# Patient Record
Sex: MALE | Race: OTHER
[De-identification: names, ages, dates, MRNs, and addresses within clinical notes are randomized per-mention and may not be internally consistent; named-entity substitution may affect disease eponyms.]

---

## 2017-01-02 ENCOUNTER — HOSPITAL ENCOUNTER (INPATIENT)
Dept: HOSPITAL 74 - YASAS | Age: 52
LOS: 4 days | Discharge: HOME | DRG: 773 | End: 2017-01-06
Attending: INTERNAL MEDICINE | Admitting: INTERNAL MEDICINE
Payer: COMMERCIAL

## 2017-01-02 VITALS — BODY MASS INDEX: 23.1 KG/M2

## 2017-01-02 DIAGNOSIS — E78.00: ICD-10-CM

## 2017-01-02 DIAGNOSIS — F10.230: ICD-10-CM

## 2017-01-02 DIAGNOSIS — F19.24: ICD-10-CM

## 2017-01-02 DIAGNOSIS — F11.23: Primary | ICD-10-CM

## 2017-01-02 DIAGNOSIS — F17.210: ICD-10-CM

## 2017-01-02 DIAGNOSIS — F39: ICD-10-CM

## 2017-01-02 DIAGNOSIS — F41.9: ICD-10-CM

## 2017-01-02 DIAGNOSIS — I10: ICD-10-CM

## 2017-01-02 DIAGNOSIS — Z59.0: ICD-10-CM

## 2017-01-02 DIAGNOSIS — F12.20: ICD-10-CM

## 2017-01-02 DIAGNOSIS — F32.9: ICD-10-CM

## 2017-01-02 LAB
APPEARANCE UR: CLEAR
BILIRUB UR STRIP.AUTO-MCNC: NEGATIVE MG/DL
COLOR UR: YELLOW
KETONES UR QL STRIP: NEGATIVE
LEUKOCYTE ESTERASE UR QL STRIP.AUTO: NEGATIVE
NITRITE UR QL STRIP: NEGATIVE
PH UR: 5 [PH] (ref 5–8)
PROT UR QL STRIP: NEGATIVE
PROT UR QL STRIP: NEGATIVE
RBC # UR STRIP: NEGATIVE /UL
SP GR UR: 1.03 (ref 1–1.03)
UROBILINOGEN UR STRIP-MCNC: NEGATIVE E.U./DL (ref 0.2–1)

## 2017-01-02 PROCEDURE — HZ2ZZZZ DETOXIFICATION SERVICES FOR SUBSTANCE ABUSE TREATMENT: ICD-10-PCS | Performed by: INTERNAL MEDICINE

## 2017-01-02 RX ADMIN — Medication SCH MG: at 22:11

## 2017-01-02 SDOH — ECONOMIC STABILITY - HOUSING INSECURITY: HOMELESSNESS: Z59.0

## 2017-01-02 NOTE — HP
COWS





- Scale


Resting Pulse: 0= VT 80 or Below


Sweatin=Flushed/Facial Moisture


Restless Observation: 3= Extraneous Movement


Pupil Size: 2= Moderately Dilated


Bone or Joint Aches: 2= Severe Diffuse Aches


Runny Nose/ Eye Tearin= Runny Nose/Eyes


GI Upset > 30mins: 3= Vomiting/Diarrhea


Tremor Observation: 2= Slight Tremor Visible


Yawning Observation: 2= >3x During Session


Anxiety or Irritability: 2=Irritable/Anxious


Goose Flesh Skin: 0=Smooth Skin


COWS Score: 20





Admission ROS [facility]





- HPI


Chief Complaint: 


I NEED HELP TO STOP USING HEROIN


Allergies/Adverse Reactions: 


 Allergies











Allergy/AdvReac Type Severity Reaction Status Date / Time


 


Fish Containing Products Allergy Mild Rash Verified 17 13:47


 


No Known Drug Allergies Allergy   Verified 17 13:47


 


cheese AdvReac Intermediate Swelling Verified 17 13:47











History of Present Illness: 


THIS 51 YEARS OLD MALE WITH HEROIN DEPENDENCE,WITHDRAWAL SYMPTOM,LAST [facility] 10/12/

16 TO 16


SEVERAL ADMISSIONS IN THE LAST


HTN


HPERCHOLESTEROLEMIA


NICOTINE DEPENDENCE


LONGEST PERIOD 10 YEARS 





Exam Limitations: No Limitations





- Ebola screening


Have you traveled outside of the country in the last 21 days: No (N)


Have you had contact with anyone from an Ebola affected area: No


Have you been sick,other than usual withdrawal symptoms: No


Do you have a fever: No





- Review of Systems


Constitutional: Chills, Diaphoresis, Loss of Appetite, Malaise, Night Sweats, 

Changes in sleep, Weakness, Unintentional Wgt. Loss


EENT: reports: Hearing Loss, Nose Congestion


Respiratory: reports: No Symptoms reported


Cardiac: reports: No Symptoms Reported


GI: reports: Diarrhea, Nausea, Vomiting, Abdominal cramping


: reports: No Symptoms Reported


Musculoskeletal: reports: Back Pain, Joint Pain, Muscle Pain, Joint Stiffness


Integumentary: reports: Dryness


Neuro: reports: Headache, Tremors


Endocrine: reports: No Symptoms Reported


Hematology: reports: No Symptoms Reported


Psychiatric: reports: Depressed





Patient History





- Patient Medical History


Hx Anemia: No


Hx Asthma: No


Hx Chronic Obstructive Pulmonary Disease (COPD): No


Hx Cardiac Disorders: No


Hx Hypertension: Yes (NO MEDICATION)


Hx Hypercholesterolemia: Yes (LIPITOR 20MG LAST TAKEN 6 MONTHS AGO)


Hx Pacemaker: No


HX Cerebrovascular Accident: No


Hx Seizures: No


Hx Dementia: No


Hx Diabetes: No


Hx Gastrointestinal Disorders: No


Hx Liver Disease: No


Hx Genitourinary Disorders: No


Hx Sexually Transmitted Disorders: No


Hx Renal Disease (ESRD): No


Hx Thyroid Disease: No


Hx Human Immunodeficiency Virus (HIV): No (LAST  NEGATIVE)


Hx Hepatitis C: No


Hx Depression: Yes (NO MED)


Hx Suicide Attempt: No


Hx Bipolar Disorder: No


Hx Schizophrenia: No


Other Medical History: NO SUIDAL,NO HOMICIDAL





- Patient Surgical History


Past Surgical History: No


Hx Neurologic Surgery: No


Hx Cataract Extraction: No


Hx Cardiac Surgery: No


Hx Lung Surgery: No


Hx Breast Surgery: No


Hx Breast Biopsy: No


Hx Abdominal Surgery: No


Hx Appendectomy: No


Hx Cholecystectomy: No


Hx Genitourinary Surgery: No


Hx  Section: No


Hx Orthopedic Surgery: No


Anesthesia Reaction: No





- PPD History


Previous Implant?: Yes


Documented Results: Negative w/o proof


Date: 10/06/15


Results: 0mm


PPD to be Administered?: No





- Smoking Cessation


Smoking history: Current every day smoker


Have you smoked in the past 12 months: Yes


Aproximately how many cigarettes per day: 15


Cigars Per Day: 0


Hx Chewing Tobacco Use: No


Initiated information on smoking cessation: Yes


'Breaking Loose' booklet given: 17





- Substance & Tx. History


Hx Alcohol Use: No


Hx Substance Use: Yes


Substance Use Type: Heroin


Hx Substance Use Treatment: Yes (LAST [facility] 10/12/15 TO 10/17/16)





- Substances Abused


  ** Heroin


Route: Inhalation


Frequency: Daily


Amount used: 6-8 bags


Age of first use: 35


Date of Last Use: 16





Family Disease History





- Family Disease History


Family Disease History: Heart Disease: Father (MI,), Mother (ETOH 

DEPENDENT AND ,MI), Brother (MI), Other: Mother





Admission Physical Exam [facility]





- Vital Signs


Vital Signs: 


 Vital Signs - 24 hr











  17





  12:18


 


Temperature 97.8 F


 


Pulse Rate 64


 


Respiratory 18





Rate 


 


Blood Pressure 133/87














- Physical


General Appearance: Yes: Moderate Distress, Tremorous, Irritable, Sweating, 

Anxious


HEENTM: Yes: Nasal Congestion


Respiratory: Yes: Lungs Clear


Neck: Yes: Within Normal Limits


Breast: Yes: Within Normal Limits


Cardiology: Yes: Within Normal Limits, Regular Rhythm, Regular Rate, S1, S2


Abdominal: Yes: Within Normal Limits, Normal Bowel Sounds, Non Tender, Soft


Genitourinary: Yes: Within Normal Limits


Back: Yes: Muscle Spasm


Musculoskeletal: Yes: Back pain, Joint Stiffness, Muscle Pain


Extremities: Yes: Tremors


Neurological: Yes: CNs II-XII NML intact, Fully Oriented, Alert, Motor Strength 

5/5


Integumentary: Yes: Dry


Lymphatic: Yes: Within Normal Limits





- Diagnostic


(1) Nicotine dependence


Current Visit: No   Status: Chronic   





(2) Opioid dependence with withdrawal


Current Visit: No   Status: Chronic





(3) Depression


Current Visit: Yes   Status: Acute   





(4) Hypertension


Current Visit: Yes   Status: Acute   





(5) Hypercholesterolemia


Current Visit: Yes   Status: Acute








Cleared for Admission [facility]





- Detox or Rehab


[facility] Level of Care: Medically Managed


Detox Regimen/Protocol: Methadone





[facility] Breath Alcohol Content


Breath Alcohol Content: 0





Urine Drug Screen





- Results


Urine Drug Screen Results: THC-Marijuana, YIMI-Cocaine, OPI-Opiates

## 2017-01-03 LAB
ALBUMIN SERPL-MCNC: 3.8 G/DL (ref 3.4–5)
ALP SERPL-CCNC: 79 U/L (ref 45–117)
ALT SERPL-CCNC: 16 U/L (ref 12–78)
ANION GAP SERPL CALC-SCNC: 7 MMOL/L (ref 8–16)
AST SERPL-CCNC: 7 U/L (ref 15–37)
BILIRUB SERPL-MCNC: 0.6 MG/DL (ref 0.2–1)
CALCIUM SERPL-MCNC: 9.1 MG/DL (ref 8.5–10.1)
CHOLEST SERPL-MCNC: 214 MG/DL (ref 50–200)
CO2 SERPL-SCNC: 29 MMOL/L (ref 21–32)
CREAT SERPL-MCNC: 0.8 MG/DL (ref 0.7–1.3)
DEPRECATED RDW RBC AUTO: 13.9 % (ref 11.9–15.9)
GLUCOSE SERPL-MCNC: 86 MG/DL (ref 74–106)
HIV 1 & 2 AB: NEGATIVE
HIV 1 AGP24: NEGATIVE
LDLC SERPL CALC-MCNC: 135 MG/DL (ref 5–100)
MCH RBC QN AUTO: 29.3 PG (ref 25.7–33.7)
MCHC RBC AUTO-ENTMCNC: 32 G/DL (ref 32–35.9)
MCV RBC: 91.4 FL (ref 80–96)
PLATELET # BLD AUTO: 235 K/MM3 (ref 134–434)
PMV BLD: 10.2 FL (ref 7.5–11.1)
PROT SERPL-MCNC: 7.2 G/DL (ref 6.4–8.2)
WBC # BLD AUTO: 6.4 K/MM3 (ref 4–10)

## 2017-01-03 RX ADMIN — Medication SCH MG: at 22:09

## 2017-01-03 RX ADMIN — Medication SCH TAB: at 10:20

## 2017-01-03 NOTE — PN
S CIWA





- CIWA Score


Nausea/Vomitin-Int. Nausea w/Dry Heave


Muscle Tremors: 3


Anxiety: 3


Agitation: 2


Paroxysmal Sweats: 3


Orientation: 0-Oriented


Tacttile Disturbances: 2-Mild Itch/Numbness/Burn


Auditory Disturbances: 0-None


Visual Disturbances: 0-None


Headache: 0-None Present


CIWA-Ar Total Score: 17





[facility]HS Progress Note (SOAP)


Subjective: 


SWEATS, NAUSEA, VOMITING, LBP


Objective: 


17 10:27


 Vital Signs











Temperature  97.7 F   17 10:25


 


Pulse Rate  72   17 10:25


 


Respiratory Rate  16   17 10:25


 


Blood Pressure  132/75   17 10:25


 


O2 Sat by Pulse Oximetry (%)      








 Vital Signs











Temperature  97.7 F   17 10:25


 


Pulse Rate  72   17 10:25


 


Respiratory Rate  16   17 10:25


 


Blood Pressure  132/75   17 10:25


 


O2 Sat by Pulse Oximetry (%)      








 Laboratory Tests











  17





  22:40


 


Urine Color  Yellow


 


Urine Appearance  Clear


 


Urine pH  5.0


 


Ur Specific Gravity  1.031


 


Urine Protein  Negative


 


Urine Glucose (UA)  Negative


 


Urine Ketones  Negative


 


Urine Blood  Negative


 


Urine Nitrite  Negative


 


Urine Bilirubin  Negative


 


Urine Urobilinogen  Negative


 


Ur Leukocyte Esterase  Negative








PENDING LABS 





17 10:28


PT AOX3 LYING IN BED 


Assessment: 





17 10:28


WITHDRAWL SX;S 


Plan: 


CONT. DETOX 


INCREASE FLUIDS 


ZOFRAN PRN 


F/UP PENDING  LABS 


MOTRIN PRN

## 2017-01-03 NOTE — CONSULT
[facility] Psychiatric Consult





- Data


Date of interview: 01/03/17


Admission source: [facility]


Identifying data: Readmission to [facility] for this 52 y/o  male 

seeking detox treatment [address] for heroin dependence.Patient is single,a 

father of six,homeless,unemployed and reportedly deprived of any source of 

income.


Substance Abuse History: - Smoking Cessation.  Smoking history: Current every 

day smoker.  Have you smoked in the past 12 months: Yes.  Aproximately how many 

cigarettes per day: 15.  Cigars Per Day: 0.  Hx Chewing Tobacco Use: No.  

Initiated information on smoking cessation: Yes.  'Breaking Loose' booklet given

: 01/02/17.  - Substance & Tx. History.  Hx Alcohol Use: No.  Hx Substance Use: 

Yes.  Substance Use Type: Heroin.  Hx Substance Use Treatment: Yes (LAST [facility] 10

/12/15 TO 10/17/16).  - Substances Abused.  ** Heroin.  Route: Inhalation.  

Frequency: Daily.  Amount used: 6-8 bags.  Age of first use: 35.  Date of Last 

Use: 12/31/16.  Confirmed by patient.


Medical History: Consistent with a history of hypertension and dyslipidemia.


Psychiatric History: History of 3-4 psychiatric hospitalizations (onset of 

psychiatric disturbances at age of 18).First psychiatric decompensation was 

precipitated by PCP intoxication.Never hospitalized in [facility].All 

admissions were in [place].Mr Garcia endorses the diagnosis of MDD.He 

admits to a remote history of suicide attempt via self-mutilation.No OPD 

care.Patient used to be on seroquel ([phone] during stay on [address] [place] [address]).Lost to 

follow up for several months.He declines to be on psychotropic medications 

other than detox agents.


Physical/Sexual Abuse/Trauma History: Patient denies.





Mental Status Exam





- Mental Status Exam


Alert and Oriented to: Time, Place, Person


Cognitive Function: Good


Patient Appearance: Well Groomed


Mood: Withdrawn, Anxious, Hopeful


Affect: Mood Congruent


Patient Behavior: Fatigued, Appropriate, Cooperative


Speech Pattern: Clear


Voice Loudness: Normal


Thought Process: Goal Oriented


Thought Disorder: Not Present


Hallucinations: Denies


Suicidal Ideation: Denies


Homicidal Ideation: Denies


Insight/Judgement: Poor


Sleep: Fair


Appetite: Good


Muscle strength/Tone: Normal


Gait/Station: Normal





Psychiatric Findings





- Problem List (Axis 1, 2,3)


(1) Heroin dependence


Current Visit: Yes   Status: Acute





(2) Nicotine dependence


Current Visit: Yes   Status: Acute   





(3) Opioid dependence with withdrawal


Current Visit: Yes   Status: Acute





(4) Drug-induced mood disorder


Current Visit: Yes   Status: Suspected





(5) Hypercholesterolemia


Current Visit: Yes   Status: Chronic





(6) Hypertension


Current Visit: Yes   Status: Chronic   








- Initial Treatment Plan


Initial Treatment Plan: Psychoeducation.Detoxification.Observation.

## 2017-01-04 RX ADMIN — Medication SCH TAB: at 10:35

## 2017-01-04 RX ADMIN — Medication SCH MG: at 22:23

## 2017-01-04 NOTE — PN
[facility] CIWA





- CIWA Score


Nausea/Vomitin-No Nausea/No Vomiting


Muscle Tremors: 4-Moderate,w/Arms Extend


Anxiety: 4-Mod. Anxious/Guarded


Agitation: 4-Moderately Restless


Paroxysmal Sweats: 3


Orientation: 0-Oriented


Tacttile Disturbances: 0-None


Auditory Disturbances: 0-None


Visual Disturbances: 0-None


Headache: 1-Very Mild


CIWA-Ar Total Score: 16





B[facility] Progress Note (SOAP)


Subjective: 


agitation


sweats


interrupted sleep


shakes mild


Objective: 


17 10:29


 Vital Signs











Temperature  96.7 F L  17 06:26


 


Pulse Rate  77   17 06:26


 


Respiratory Rate  18   17 06:26


 


Blood Pressure  110/70   17 06:26


 


O2 Sat by Pulse Oximetry (%)      








 Laboratory Tests











  17





  06:30 22:40 06:30


 


WBC    6.4


 


RBC    4.89


 


Hgb    14.3  D


 


Hct    44.7


 


MCV    91.4


 


MCHC    32.0


 


RDW    13.9


 


Plt Count    235


 


MPV    10.2


 


Sodium   


 


Potassium   


 


Chloride   


 


Carbon Dioxide   


 


Anion Gap   


 


BUN   


 


Creatinine   


 


Creat Clearance w eGFR   


 


Random Glucose   


 


Calcium   


 


Total Bilirubin   


 


AST   


 


ALT   


 


Alkaline Phosphatase   


 


Total Protein   


 


Albumin   


 


Triglycerides   


 


Cholesterol   


 


Total LDL Cholesterol   


 


HDL Cholesterol   


 


Urine Color   Yellow 


 


Urine Appearance   Clear 


 


Urine pH   5.0 


 


Ur Specific Gravity   1.031 


 


Urine Protein   Negative 


 


Urine Glucose (UA)   Negative 


 


Urine Ketones   Negative 


 


Urine Blood   Negative 


 


Urine Nitrite   Negative 


 


Urine Bilirubin   Negative 


 


Urine Urobilinogen   Negative 


 


Ur Leukocyte Esterase   Negative 


 


RPR Titer   


 


HIV 1&2 Antibody Screen  Negative  


 


HIV P24 Antigen  Negative  














  17





  06:30 06:30


 


WBC  


 


RBC  


 


Hgb  


 


Hct  


 


MCV  


 


MCHC  


 


RDW  


 


Plt Count  


 


MPV  


 


Sodium  143 


 


Potassium  4.5 


 


Chloride  107 


 


Carbon Dioxide  29 


 


Anion Gap  7 L 


 


BUN  9 


 


Creatinine  0.8 


 


Creat Clearance w eGFR  > 60 


 


Random Glucose  86 


 


Calcium  9.1 


 


Total Bilirubin  0.6 


 


AST  7 L 


 


ALT  16 


 


Alkaline Phosphatase  79  D 


 


Total Protein  7.2 


 


Albumin  3.8  D 


 


Triglycerides  108 


 


Cholesterol  214 H 


 


Total LDL Cholesterol  135 H 


 


HDL Cholesterol  71 H 


 


Urine Color  


 


Urine Appearance  


 


Urine pH  


 


Ur Specific Gravity  


 


Urine Protein  


 


Urine Glucose (UA)  


 


Urine Ketones  


 


Urine Blood  


 


Urine Nitrite  


 


Urine Bilirubin  


 


Urine Urobilinogen  


 


Ur Leukocyte Esterase  


 


RPR Titer   Nonreactive


 


HIV 1&2 Antibody Screen  


 


HIV P24 Antigen  














awake/alert


ambulating


no acute distress


Assessment: 


17 10:31


withdrawal sx











Plan: 


continue detox


increase fluids


pt advised to see pmd for evaluation on his increased lipids after discharge 

from detox. 


pt is not currently taking any hypercholesterol medication.

## 2017-01-05 RX ADMIN — Medication SCH TAB: at 10:09

## 2017-01-05 RX ADMIN — Medication SCH MG: at 22:18

## 2017-01-05 NOTE — PN
[facility] Progress Note (SOAP)


Subjective: 


sweats , shakes , n/diarrhea, stomach discomfort 


Objective: 





01/05/17 10:33


 Vital Signs











Temperature  98.4 F   01/05/17 09:55


 


Pulse Rate  71   01/05/17 09:55


 


Respiratory Rate  18   01/05/17 09:55


 


Blood Pressure  149/75   01/05/17 09:55


 


O2 Sat by Pulse Oximetry (%)      








 Laboratory Tests











  01/02/17 01/02/17 01/03/17





  06:30 22:40 06:30


 


WBC    6.4


 


RBC    4.89


 


Hgb    14.3  D


 


Hct    44.7


 


MCV    91.4


 


MCHC    32.0


 


RDW    13.9


 


Plt Count    235


 


MPV    10.2


 


Sodium   


 


Potassium   


 


Chloride   


 


Carbon Dioxide   


 


Anion Gap   


 


BUN   


 


Creatinine   


 


Creat Clearance w eGFR   


 


Random Glucose   


 


Calcium   


 


Total Bilirubin   


 


AST   


 


ALT   


 


Alkaline Phosphatase   


 


Total Protein   


 


Albumin   


 


Triglycerides   


 


Cholesterol   


 


Total LDL Cholesterol   


 


HDL Cholesterol   


 


Urine Color   Yellow 


 


Urine Appearance   Clear 


 


Urine pH   5.0 


 


Ur Specific Gravity   1.031 


 


Urine Protein   Negative 


 


Urine Glucose (UA)   Negative 


 


Urine Ketones   Negative 


 


Urine Blood   Negative 


 


Urine Nitrite   Negative 


 


Urine Bilirubin   Negative 


 


Urine Urobilinogen   Negative 


 


Ur Leukocyte Esterase   Negative 


 


RPR Titer   


 


HIV 1&2 Antibody Screen  Negative  


 


HIV P24 Antigen  Negative  














  01/03/17 01/03/17





  06:30 06:30


 


WBC  


 


RBC  


 


Hgb  


 


Hct  


 


MCV  


 


MCHC  


 


RDW  


 


Plt Count  


 


MPV  


 


Sodium  143 


 


Potassium  4.5 


 


Chloride  107 


 


Carbon Dioxide  29 


 


Anion Gap  7 L 


 


BUN  9 


 


Creatinine  0.8 


 


Creat Clearance w eGFR  > 60 


 


Random Glucose  86 


 


Calcium  9.1 


 


Total Bilirubin  0.6 


 


AST  7 L 


 


ALT  16 


 


Alkaline Phosphatase  79  D 


 


Total Protein  7.2 


 


Albumin  3.8  D 


 


Triglycerides  108 


 


Cholesterol  214 H 


 


Total LDL Cholesterol  135 H 


 


HDL Cholesterol  71 H 


 


Urine Color  


 


Urine Appearance  


 


Urine pH  


 


Ur Specific Gravity  


 


Urine Protein  


 


Urine Glucose (UA)  


 


Urine Ketones  


 


Urine Blood  


 


Urine Nitrite  


 


Urine Bilirubin  


 


Urine Urobilinogen  


 


Ur Leukocyte Esterase  


 


RPR Titer   Nonreactive


 


HIV 1&2 Antibody Screen  


 


HIV P24 Antigen  








pt aox3 in nad ambulating 


Assessment: 


01/05/17 10:48


withdrawl sx's 





01/05/17 10:49





Plan: 


cont. detox 


increase fluids 


myanta prn

## 2017-01-06 VITALS — DIASTOLIC BLOOD PRESSURE: 99 MMHG | SYSTOLIC BLOOD PRESSURE: 160 MMHG | HEART RATE: 63 BPM | TEMPERATURE: 98.1 F

## 2017-01-06 RX ADMIN — Medication SCH TAB: at 09:58

## 2017-01-06 NOTE — DS
[facility] Detox Discharge Summary


Admission Date: 


01/02/17





Discharge Date: 01/06/17





- History


Present History: Alcohol Dependence, Cannabis Dependence, Opioid Dependence





- Physical Exam Results


Vital Signs: 


 Vital Signs











Temperature  97.9 F   01/06/17 06:00


 


Pulse Rate  55 L  01/06/17 06:00


 


Respiratory Rate  18   01/06/17 06:00


 


Blood Pressure  134/79   01/06/17 06:00


 


O2 Sat by Pulse Oximetry (%)      














- Treatment


Hospital Course: Detox Protocol Followed, Detoxed Safely, Responded well, 

Discharged Condition Good, Rehab Referral Accepted





- Medication


Discharge Medications: 


Ambulatory Orders





NK [No Known Home Medication]  10/12/16 











- Diagnosis


(1) Depression


Current Visit: Yes   Status: Chronic   





(2) Alcohol dependence with uncomplicated withdrawal


Current Visit: Yes   Status: Chronic





(3) Cannabis dependence


Current Visit: Yes   Status: Chronic





(4) Hypercholesterolemia


Current Visit: Yes   Status: Chronic





(5) Hypertension


Current Visit: Yes   Status: Chronic   Qualifiers: 


     Hypertension type: essential hypertension        Qualified Code(s): I10 - 

Essential (primary) hypertension  





(6) Nicotine dependence, uncomplicated


Current Visit: Yes   Status: Chronic   Qualifiers: 


     Nicotine product type: cigarettes        Qualified Code(s): F17.210 - 

Nicotine dependence, cigarettes, uncomplicated  





(7) Opioid dependence with withdrawal


Current Visit: Yes   Status: Chronic





(8) Drug-induced mood disorder


Current Visit: Yes   Status: Suspected





(9) mood disorder nos


Current Visit: No   Status: Active





(10) Anxiety disorder


Current Visit: No   Status: Chronic








- AMA


Did Patient Leave Against Medical Advice: No (pt was p/u for rehab at 

[facility]. )

## 2017-11-14 ENCOUNTER — HOSPITAL ENCOUNTER (INPATIENT)
Dept: HOSPITAL 74 - YASAS | Age: 52
LOS: 6 days | Discharge: OTHER INSTITUTION | DRG: 773 | End: 2017-11-20
Attending: INTERNAL MEDICINE | Admitting: INTERNAL MEDICINE
Payer: COMMERCIAL

## 2017-11-14 VITALS — BODY MASS INDEX: 23.2 KG/M2

## 2017-11-14 DIAGNOSIS — F11.23: Primary | ICD-10-CM

## 2017-11-14 DIAGNOSIS — G47.00: ICD-10-CM

## 2017-11-14 DIAGNOSIS — F19.24: ICD-10-CM

## 2017-11-14 DIAGNOSIS — E78.00: ICD-10-CM

## 2017-11-14 DIAGNOSIS — F10.230: ICD-10-CM

## 2017-11-14 DIAGNOSIS — R63.4: ICD-10-CM

## 2017-11-14 DIAGNOSIS — Z59.0: ICD-10-CM

## 2017-11-14 DIAGNOSIS — I10: ICD-10-CM

## 2017-11-14 DIAGNOSIS — F17.210: ICD-10-CM

## 2017-11-14 LAB
APPEARANCE UR: (no result)
BILIRUB UR STRIP.AUTO-MCNC: NEGATIVE MG/DL
COLOR UR: YELLOW
KETONES UR QL STRIP: NEGATIVE
NITRITE UR QL STRIP: NEGATIVE
PH UR: 5 [PH] (ref 5–8)
PROT UR QL STRIP: NEGATIVE
PROT UR QL STRIP: NEGATIVE
RBC # UR STRIP: NEGATIVE /UL
SP GR UR: 1.03 (ref 1–1.03)
UROBILINOGEN UR STRIP-MCNC: NEGATIVE MG/DL (ref 0.2–1)

## 2017-11-14 PROCEDURE — HZ2ZZZZ DETOXIFICATION SERVICES FOR SUBSTANCE ABUSE TREATMENT: ICD-10-PCS | Performed by: INTERNAL MEDICINE

## 2017-11-14 RX ADMIN — Medication SCH MG: at 22:09

## 2017-11-14 SDOH — ECONOMIC STABILITY - HOUSING INSECURITY: HOMELESSNESS: Z59.0

## 2017-11-14 NOTE — HP
COWS





- Scale


Resting Pulse: 0= DE 80 or Below


Sweatin= Chills/Flushing


Restless Observation: 1= Difficult to Sit Still


Pupil Size: 1= Pupils >than Normal


Bone or Joint Aches: 1= Mild Discomfort


Runny Nose/ Eye Tearin= Nasal Congestion


GI Upset > 30mins: 3= Vomiting/Diarrhea


Tremor Observation: 2= Slight Tremor Visible


Yawning Observation: 1= 1-2x During Session


Anxiety or Irritability: 2=Irritable/Anxious


Goose Flesh Skin: 3=Piloerection


COWS Score: 16





CIWA Score





- CIWA Score


Nausea/Vomiting: 3


Muscle Tremors: 2


Anxiety: 2


Agitation: 1-Slight > Activity


Paroxysmal Sweats: 2


Orientation: 0-Oriented


Tacttile Disturbances: 0-None


Auditory Disturbances: 1-Very Mild


Visual Disturbances: 1-Very Mild Sensitivity


Headache: 1-Very Mild


CIWA-Ar Total Score: 13





Admission ROS S





- HPI


Chief Complaint: 





WITHDRAWAL SYMPTOMS 


Allergies/Adverse Reactions: 


 Allergies











Allergy/AdvReac Type Severity Reaction Status Date / Time


 


Fish Containing Products Allergy Mild Rash Verified 17 18:57


 


No Known Drug Allergies Allergy   Verified 17 18:57


 


cheese AdvReac Intermediate Swelling Verified 17 18:57











History of Present Illness: 





52 Y.O. MAN WITH AN EXTENSIVE HISTORY OF HEROIN AND ALCOHOL DEPENDENCE IS HERE 

SEEKING DETOX.  HE HAS HAD MULTIPLE ADMISSIONS WITH HIS LAST BEING  FOR DETOX. LONGEST PERIOD CLEAN HAS BEEN 15 YEARS WHILE INCARCERATED. 


Exam Limitations: No Limitations





- Ebola screening


Have you traveled outside of the country in the last 21 days: No


Have you had contact with anyone from an Ebola affected area: No


Have you been sick,other than usual withdrawal symptoms: No


Do you have a fever: No





- Review of Systems


Constitutional: Chills, Loss of Appetite, Unintentional Wgt. Loss


EENT: reports: Blurred Vision, Tearing, Nose Congestion


Respiratory: reports: No Symptoms reported


Cardiac: reports: No Symptoms Reported


GI: reports: Diarrhea, Poor Appetite, Vomiting, Abdominal cramping


: reports: No Symptoms Reported


Musculoskeletal: reports: Back Pain


Integumentary: reports: Dryness


Neuro: reports: Tremors


Endocrine: reports: No Symptoms Reported


Hematology: reports: No Symptoms Reported


Psychiatric: reports: Judgement Intact, Mood/Affect Appropiate, Orientated x3, 

Anxious, Depressed


Other Systems: Reviewed and Negative





Patient History





- Patient Medical History


Hx Anemia: No


Hx Asthma: No


Hx Chronic Obstructive Pulmonary Disease (COPD): No


Hx Cancer: No


Hx Cardiac Disorders: Yes (MI-13 YEARS AGO )


Hx Congestive Heart Failure: No


Hx Hypertension: Yes (NO MEDICATION)


Hx Hypercholesterolemia: Yes (LIPITOR 20MG LAST TAKEN 6 MONTHS AGO)


Hx Pacemaker: No


HX Cerebrovascular Accident: No


Hx Seizures: No


Hx Dementia: No


Hx Diabetes: No


Hx Gastrointestinal Disorders: No


Hx Liver Disease: No


Hx Genitourinary Disorders: No


Hx Sexually Transmitted Disorders: No


Hx Renal Disease (ESRD): No


Hx Thyroid Disease: No


Hx Human Immunodeficiency Virus (HIV): No (LAST  NEGATIVE)


Hx Hepatitis C: No


Hx Depression: Yes (NO MED)


Hx Suicide Attempt: No


Hx Bipolar Disorder: No


Hx Schizophrenia: No





- Patient Surgical History


Past Surgical History: No


Hx Neurologic Surgery: No


Hx Cataract Extraction: No


Hx Cardiac Surgery: No


Hx Lung Surgery: No


Hx Breast Surgery: No


Hx Breast Biopsy: No


Hx Abdominal Surgery: No


Hx Appendectomy: No


Hx Cholecystectomy: No


Hx Genitourinary Surgery: No


Hx  Section: No


Hx Orthopedic Surgery: No


Anesthesia Reaction: No





- PPD History


Previous Implant?: Yes


Documented Results: Negative w/proof


Date: 17


Results: 0mm


PPD to be Administered?: No





- Reproductive History


Patient is a Female of Child Bearing Age (11 -55 yrs old): No





- Smoking Cessation


Smoking history: Current every day smoker


Have you smoked in the past 12 months: Yes


Aproximately how many cigarettes per day: 15


Cigars Per Day: 0


Hx Chewing Tobacco Use: No


Initiated information on smoking cessation: Yes


'Breaking Loose' booklet given: 17





- Substance & Tx. History


Hx Alcohol Use: Yes


Hx Substance Use: Yes


Substance Use Type: Alcohol, Heroin


Hx Substance Use Treatment: Yes (DETOX: 2017; NO RECENT REHAB )





- Substances Abused


  ** Alcohol


Route: Oral


Frequency: Daily


Amount used: LIQUOR- 1 , BEER- 1 SIX PACK


Age of first use: 15


Date of Last Use: 17





  ** Heroin


Route: Inhalation


Frequency: Daily


Amount used: 4 BAGS


Age of first use: 11


Date of Last Use: 17





Family Disease History





- Family Disease History


Family Disease History: Heart Disease: Father (MI,), Mother (ETOH 

DEPENDENT AND ,MI), Brother (MI), Other: Mother





Admission Physical Exam [facility]





- Vital Signs


Vital Signs: 


 Vital Signs - 24 hr











  17





  17:39


 


Temperature 97.2 F L


 


Pulse Rate 71


 


Respiratory 16





Rate 


 


Blood Pressure 137/81














- Physical


General Appearance: Yes: Disheveled, Sweating, Anxious


HEENTM: Yes: Hearing grossly Normal, Normocephalic, Normal Voice


Respiratory: Yes: Chest Non-Tender, Lungs Clear, Normal Breath Sounds


Neck: Yes: No masses,lesions,Nodules, Trachea in good position


Breast: Yes: Breast Exam Deferred


Cardiology: Yes: Regular Rhythm, Regular Rate


Abdominal: Yes: Normal Bowel Sounds, Non Tender, Flat


Genitourinary: Yes: Other (NO COMPLAINTS REPORTED)


Back: Yes: Normal Inspection


Musculoskeletal: Yes: Gait Steady, Back pain


Extremities: Yes: Normal Capillary Refill, Normal Inspection, Normal Range of 

Motion


Neurological: Yes: Fully Oriented, Alert, Motor Strength 5/5, Normal Mood/Affect

, Normal Response


Integumentary: Yes: Dry


Lymphatic: Yes: Within Normal Limits





- Diagnostic


(1) Alcohol dependence with uncomplicated withdrawal


Current Visit: Yes   Status: Chronic   





(2) Hypercholesterolemia


Current Visit: Yes   Status: Chronic   





(3) Hypertension


Current Visit: Yes   Status: Chronic   


Qualifiers: 


   Hypertension type: essential hypertension   Qualified Code(s): I10 - 

Essential (primary) hypertension   





(4) Nicotine dependence, uncomplicated


Current Visit: Yes   Status: Chronic   


Qualifiers: 


   Nicotine product type: cigarettes   Qualified Code(s): F17.210 - Nicotine 

dependence, cigarettes, uncomplicated   





(5) Opioid dependence with withdrawal


Current Visit: Yes   Status: Chronic   





(6) Weight loss


Current Visit: Yes   Status: Acute   





Cleared for Admission [facility]





- Detox or Rehab


[facility] Level of Care: Medically Managed


Detox Regimen/Protocol: Methadone/Librium





[facility] Breath Alcohol Content


Breath Alcohol Content: 0





Urine Drug Screen





- Results


Drug Screen Negative: No


Urine Drug Screen Results: OPI-Opiates, MTD-Methadone, TCA-Tricyclic Antidepress

## 2017-11-15 LAB
ALBUMIN SERPL-MCNC: 3.1 G/DL (ref 3.4–5)
ALP SERPL-CCNC: 66 U/L (ref 45–117)
ALT SERPL-CCNC: 18 U/L (ref 12–78)
ANION GAP SERPL CALC-SCNC: 9 MMOL/L (ref 8–16)
AST SERPL-CCNC: 7 U/L (ref 15–37)
BILIRUB SERPL-MCNC: 0.4 MG/DL (ref 0.2–1)
CALCIUM SERPL-MCNC: 8.2 MG/DL (ref 8.5–10.1)
CO2 SERPL-SCNC: 27 MMOL/L (ref 21–32)
CREAT SERPL-MCNC: 0.7 MG/DL (ref 0.7–1.3)
DEPRECATED RDW RBC AUTO: 13.9 % (ref 11.9–15.9)
GLUCOSE SERPL-MCNC: 70 MG/DL (ref 74–106)
HIV 1 & 2 AB: NEGATIVE
HIV 1 AGP24: NEGATIVE
MCH RBC QN AUTO: 29.3 PG (ref 25.7–33.7)
MCHC RBC AUTO-ENTMCNC: 32.2 G/DL (ref 32–35.9)
MCV RBC: 91 FL (ref 80–96)
PLATELET # BLD AUTO: 249 K/MM3 (ref 134–434)
PMV BLD: 9.5 FL (ref 7.5–11.1)
PROT SERPL-MCNC: 6.1 G/DL (ref 6.4–8.2)
WBC # BLD AUTO: 6.6 K/MM3 (ref 4–10)

## 2017-11-15 RX ADMIN — Medication SCH TAB: at 10:07

## 2017-11-15 RX ADMIN — Medication SCH MG: at 22:04

## 2017-11-15 RX ADMIN — QUETIAPINE FUMARATE SCH MG: 100 TABLET ORAL at 22:05

## 2017-11-15 NOTE — PN
S CIWA





- CIWA Score


Nausea/Vomitin (N/V)


Muscle Tremors: 2


Anxiety: 3


Agitation: 3


Paroxysmal Sweats: 1-Minimal Palms Moist


Orientation: 0-Oriented


Tacttile Disturbances: 2-Mild Itch/Numbness/Burn


Auditory Disturbances: 0-None


Visual Disturbances: 0-None


Headache: 0-None Present


CIWA-Ar Total Score: 16





BHS Progress Note (SOAP)


Subjective: 





ANXIETY,SWEATS,NAUSEA/VOMITING,INTERMITTENT SLEEP.


Objective: 





11/15/17 08:46


 Vital Signs











Temperature  97.7 F   11/15/17 05:44


 


Pulse Rate  56 L  11/15/17 05:44


 


Respiratory Rate  18   11/15/17 05:44


 


Blood Pressure  119/71   11/15/17 05:44


 


O2 Sat by Pulse Oximetry (%)      








 Laboratory Last Values











Urine Color  Yellow   17  21:30    


 


Urine Appearance  Slcloudy   17  21:30    


 


Urine pH  5.0  (5.0-8.0)   17  21:30    


 


Ur Specific Gravity  1.027  (1.001-1.035)   17  21:30    


 


Urine Protein  Negative  (NEGATIVE)   17  21:30    


 


Urine Glucose (UA)  Negative  (NEGATIVE)   17  21:30    


 


Urine Ketones  Negative  (NEGATIVE)   17  21:30    


 


Urine Blood  Negative  (NEGATIVE)   17  21:30    


 


Urine Nitrite  Negative  (NEGATIVE)   17  21:30    


 


Urine Bilirubin  Negative  (NEGATIVE)   17  21:30    


 


Urine Urobilinogen  Negative mg/dL (0.2-1.0)   17  21:30    








OTHER LABS PENDING


Assessment: 





11/15/17 08:47


WITHDRAWAL SX


Plan: 





CONTINUE DETOX

## 2017-11-15 NOTE — CONSULT
BHS Psychiatric Consult





- Data


Date of interview: 11/15/17


Admission source: [facility]


Identifying data: Another admission to [facility] for this 51 y/o  male 

seeking detox treatment [address] for heroin dependence.Patient is single,a 

father of six,homeless,unemployed and supported on food stamps.


Substance Abuse History: Patient admits to active use of alcohol and heroin as 

detailed in this report from [facility] .  Smoking history: Current every day smoker.  

Have you smoked in the past 12 months: Yes.  Aproximately how many cigarettes 

per day: 15.  Cigars Per Day: 0.  Hx Chewing Tobacco Use: No.  Initiated 

information on smoking cessation: Yes.  'Breaking Loose' booklet given: 11/14/ 17.  - Substance & Tx. History.  Hx Alcohol Use: Yes.  Hx Substance Use: Yes.  

Substance Use Type: Alcohol, Heroin.  Hx Substance Use Treatment: Yes (DETOX: 1/ 2017; NO RECENT REHAB ).  - Substances Abused.  ** Alcohol.  Route: Oral.  

Frequency: Daily.  Amount used: LIQUOR- 1 , BEER- 1 SIX PACK.  Age of first use

: 15.  Date of Last Use: 11/14/17.  ** Heroin.  Route: Inhalation.  Frequency: 

Daily.  Amount used: 4 BAGS.  Age of first use: 11.  Date of Last Use: 11/13/17


Medical History: History of myocardial infarction,hypertension and dyslipidemia.


Psychiatric History: History of multiple psychiatric hospitalizations.Onset of 

psychiatric disturbances : age 18.[place] hospitalizations [place].Mr Garcia reports the diagnosis of MDD.Remote history of suicide 

attempt via self-mutilation.No OPD care.Patient used to be on seroquel (2011-

2013 during stay on [address] [place] [address]).Lost to follow up for several months.Patient 

requests that seroquel 100 mg be included in this hospital course.


Physical/Sexual Abuse/Trauma History: No reported history of abuse.


Additional Comment: Urine Drug Screen Results: OPI-Opiates, MTD-Methadone, TCA-

Tricyclic Antidepressant.Noted.





Mental Status Exam





- Mental Status Exam


Alert and Oriented to: Time, Place, Person


Cognitive Function: Good


Patient Appearance: Well Groomed


Mood: Nervous, Withdrawn, Anxious


Affect: Mood Congruent


Patient Behavior: Fatigued, Appropriate, Cooperative


Speech Pattern: Clear


Voice Loudness: Normal


Thought Process: Goal Oriented


Thought Disorder: Not Present


Hallucinations: Denies


Suicidal Ideation: Denies


Homicidal Ideation: Denies


Insight/Judgement: Poor


Sleep: Poorly, Difficulty falling asleep


Appetite: Good


Muscle strength/Tone: Normal


Gait/Station: Normal





Psychiatric Findings





- Problem List (Axis 1, 2,3)


(1) Alcohol dependence with uncomplicated withdrawal


Current Visit: Yes   Status: Acute   





(2) Opioid dependence with withdrawal


Current Visit: Yes   Status: Acute   





(3) Nicotine dependence, uncomplicated


Current Visit: Yes   Status: Acute   


Qualifiers: 


   Nicotine product type: cigarettes   Qualified Code(s): F17.210 - Nicotine 

dependence, cigarettes, uncomplicated   





(4) Drug-induced mood disorder


Current Visit: Yes   Status: Acute   





(5) Insomnia


Current Visit: Yes   Status: Acute   





- Initial Treatment Plan


Initial Treatment Plan: Psychoeducation.Sleep hygiene.Detoxification in 

progress.Seroquel 100 mg po hs.Side effects/benefits are discussed with 

patient.He agrees with this careplan.Observation.

## 2017-11-15 NOTE — EKG
Test Reason : 

Blood Pressure : ***/*** mmHG

Vent. Rate : 064 BPM     Atrial Rate : 064 BPM

   P-R Int : 132 ms          QRS Dur : 084 ms

    QT Int : 398 ms       P-R-T Axes : -18 084 051 degrees

   QTc Int : 410 ms

 

NORMAL SINUS RHYTHM

NORMAL ECG

NO PREVIOUS ECGS AVAILABLE

Confirmed by BLANCA AMEZCUA, SEFERINO ([id number]) on 11/15/2017 11:38:14 AM

 

Referred By:             Confirmed By:SEFERINO RIOS MD

## 2017-11-16 RX ADMIN — METHADONE HYDROCHLORIDE SCH MG: 5 TABLET ORAL at 10:02

## 2017-11-16 RX ADMIN — QUETIAPINE FUMARATE SCH MG: 100 TABLET ORAL at 22:01

## 2017-11-16 RX ADMIN — Medication SCH MG: at 22:01

## 2017-11-16 RX ADMIN — Medication SCH TAB: at 10:02

## 2017-11-16 NOTE — PN
[facility] CIWA





- CIWA Score


Nausea/Vomitin-No Nausea/No Vomiting


Muscle Tremors: 4-Moderate,w/Arms Extend


Anxiety: 3


Agitation: 0-Normal Activity


Paroxysmal Sweats: 3


Orientation: 4Disoriented Place/Person


Tacttile Disturbances: 0-None


Auditory Disturbances: 0-None


Visual Disturbances: 0-None


Headache: 3-Moderate


CIWA-Ar Total Score: 17





BHS COWS





- Scale


Resting Pulse: 0= AK 80 or Below


Sweatin= Chills/Flushing


Restless Observation: 1= Difficult to Sit Still


Pupil Size: 0= Normal to Room Light


Bone or Joint Aches: 2= Severe Diffuse Aches


Runny Nose/ Eye Tearin= None


GI Upset > 30mins: 2= Nausea/Diarrhea


Tremor Observation of Outstretched Hands: 2= Slight Tremor Visible


Yawning Observation: 1= 1-2x During Session


Anxiety or Irritability: 2=Irritable/Anxious


Goose Flesh Skin: 3=Piloerection


COWS Score: 14





BHS Progress Note (SOAP)


Subjective: 





Diarrhea, Tremors, H/A, Body Aches, Sweating.


Objective: 


PT. A & O X 2 (UNCERTAIN ABOUT CURRENT LOCATION). PT. OBSERVED AMBULATING ON 

UNIT. NO ACUTE DISTRESS.





17 13:43


 Vital Signs











Temperature  99.0 F   17 13:07


 


Pulse Rate  78   17 13:07


 


Respiratory Rate  18   17 13:07


 


Blood Pressure  127/79   17 13:07


 


O2 Sat by Pulse Oximetry (%)      








 Laboratory Tests











  11/14/17 11/14/17 11/15/17





  07:00 21:30 07:00


 


WBC   


 


RBC   


 


Hgb   


 


Hct   


 


MCV   


 


MCH   


 


MCHC   


 


RDW   


 


Plt Count   


 


MPV   


 


Sodium   


 


Potassium   


 


Chloride   


 


Carbon Dioxide   


 


Anion Gap   


 


BUN   


 


Creatinine   


 


Creat Clearance w eGFR   


 


Random Glucose   


 


Calcium   


 


Total Bilirubin   


 


AST   


 


ALT   


 


Alkaline Phosphatase   


 


Total Protein   


 


Albumin   


 


Urine Color   Yellow 


 


Urine Appearance   Slcloudy 


 


Urine pH   5.0 


 


Ur Specific Gravity   1.027 


 


Urine Protein   Negative 


 


Urine Glucose (UA)   Negative 


 


Urine Ketones   Negative 


 


Urine Blood   Negative 


 


Urine Nitrite   Negative 


 


Urine Bilirubin   Negative 


 


Urine Urobilinogen   Negative 


 


Ur Leukocyte Esterase   Negative 


 


RPR Titer   


 


Hepatitis C Antibody  <0.1  


 


HIV 1&2 Antibody Screen    Negative


 


HIV P24 Antigen    Negative














  11/15/17 11/15/17 11/15/17





  07:00 07:00 07:00


 


WBC  6.6  


 


RBC  4.40  


 


Hgb  12.9  


 


Hct  40.1  


 


MCV  91.0  


 


MCH  29.3  


 


MCHC  32.2  


 


RDW  13.9  


 


Plt Count  249  


 


MPV  9.5  


 


Sodium   143 


 


Potassium   4.7 


 


Chloride   107 


 


Carbon Dioxide   27 


 


Anion Gap   9 


 


BUN   12  D 


 


Creatinine   0.7 


 


Creat Clearance w eGFR   > 60 


 


Random Glucose   70 L 


 


Calcium   8.2 L 


 


Total Bilirubin   0.4  D 


 


AST   7 L 


 


ALT   18 


 


Alkaline Phosphatase   66 


 


Total Protein   6.1 L 


 


Albumin   3.1 L 


 


Urine Color   


 


Urine Appearance   


 


Urine pH   


 


Ur Specific Gravity   


 


Urine Protein   


 


Urine Glucose (UA)   


 


Urine Ketones   


 


Urine Blood   


 


Urine Nitrite   


 


Urine Bilirubin   


 


Urine Urobilinogen   


 


Ur Leukocyte Esterase   


 


RPR Titer    Nonreactive


 


Hepatitis C Antibody   


 


HIV 1&2 Antibody Screen   


 


HIV P24 Antigen   








LABS NOTED.


17 13:45





Assessment: 





17 13:44


WITHDRAWAL SYMPTOMS.


Plan: 





CONTINUE DETOX.


INCREASE DAILY PO FLUID INTAKE.


PRN IMMODIUM FOR DIARRHEA.

## 2017-11-17 RX ADMIN — METHADONE HYDROCHLORIDE SCH MG: 5 TABLET ORAL at 10:08

## 2017-11-17 RX ADMIN — Medication SCH MG: at 22:05

## 2017-11-17 RX ADMIN — QUETIAPINE FUMARATE SCH MG: 100 TABLET ORAL at 22:05

## 2017-11-17 RX ADMIN — Medication SCH TAB: at 10:09

## 2017-11-17 NOTE — PN
BHS Progress Note (SOAP)


Subjective: 





Diarrhea, Tremors, Sweating, Nausea, Stomach Cramping, Body Aches.


Objective: 


PT. A & O X 3, OBSERVED AMBULATING ON UNIT. NO ACUTE DISTRESS.





11/17/17 12:30


 Vital Signs











Temperature  98.0 F   11/17/17 10:17


 


Pulse Rate  87   11/17/17 10:17


 


Respiratory Rate  18   11/17/17 10:17


 


Blood Pressure  123/76   11/17/17 10:17


 


O2 Sat by Pulse Oximetry (%)      








 Laboratory Tests











  11/14/17 11/14/17 11/15/17





  07:00 21:30 07:00


 


WBC   


 


RBC   


 


Hgb   


 


Hct   


 


MCV   


 


MCH   


 


MCHC   


 


RDW   


 


Plt Count   


 


MPV   


 


Sodium   


 


Potassium   


 


Chloride   


 


Carbon Dioxide   


 


Anion Gap   


 


BUN   


 


Creatinine   


 


Creat Clearance w eGFR   


 


Random Glucose   


 


Calcium   


 


Total Bilirubin   


 


AST   


 


ALT   


 


Alkaline Phosphatase   


 


Total Protein   


 


Albumin   


 


Urine Color   Yellow 


 


Urine Appearance   Slcloudy 


 


Urine pH   5.0 


 


Ur Specific Gravity   1.027 


 


Urine Protein   Negative 


 


Urine Glucose (UA)   Negative 


 


Urine Ketones   Negative 


 


Urine Blood   Negative 


 


Urine Nitrite   Negative 


 


Urine Bilirubin   Negative 


 


Urine Urobilinogen   Negative 


 


Ur Leukocyte Esterase   Negative 


 


RPR Titer   


 


Hepatitis C Antibody  <0.1  


 


HIV 1&2 Antibody Screen    Negative


 


HIV P24 Antigen    Negative














  11/15/17 11/15/17 11/15/17





  07:00 07:00 07:00


 


WBC  6.6  


 


RBC  4.40  


 


Hgb  12.9  


 


Hct  40.1  


 


MCV  91.0  


 


MCH  29.3  


 


MCHC  32.2  


 


RDW  13.9  


 


Plt Count  249  


 


MPV  9.5  


 


Sodium   143 


 


Potassium   4.7 


 


Chloride   107 


 


Carbon Dioxide   27 


 


Anion Gap   9 


 


BUN   12  D 


 


Creatinine   0.7 


 


Creat Clearance w eGFR   > 60 


 


Random Glucose   70 L 


 


Calcium   8.2 L 


 


Total Bilirubin   0.4  D 


 


AST   7 L 


 


ALT   18 


 


Alkaline Phosphatase   66 


 


Total Protein   6.1 L 


 


Albumin   3.1 L 


 


Urine Color   


 


Urine Appearance   


 


Urine pH   


 


Ur Specific Gravity   


 


Urine Protein   


 


Urine Glucose (UA)   


 


Urine Ketones   


 


Urine Blood   


 


Urine Nitrite   


 


Urine Bilirubin   


 


Urine Urobilinogen   


 


Ur Leukocyte Esterase   


 


RPR Titer    Nonreactive


 


Hepatitis C Antibody   


 


HIV 1&2 Antibody Screen   


 


HIV P24 Antigen   








LABS NOTED.


Assessment: 





11/17/17 12:31


WITHDRAWAL SYMPTOMS.


Plan: 





CONTINUE DETOX.

## 2017-11-18 RX ADMIN — QUETIAPINE FUMARATE SCH MG: 100 TABLET ORAL at 22:03

## 2017-11-18 RX ADMIN — Medication SCH MG: at 22:03

## 2017-11-18 RX ADMIN — Medication SCH TAB: at 10:05

## 2017-11-18 NOTE — PN
BHS Progress Note (SOAP)


Subjective: 





Diarrhea, Stomach Cramping, Body Aches, Interrupted Sleep, Tremors, Anxious.


Objective: 


PT. A & O X 3, OBSERVED AMBULATING ON UNIT. NO ACUTE DISTRESS.





11/18/17 14:20


 Vital Signs











Temperature  98.7 F   11/18/17 13:37


 


Pulse Rate  88   11/18/17 13:37


 


Respiratory Rate  18   11/18/17 13:37


 


Blood Pressure  134/79   11/18/17 13:37


 


O2 Sat by Pulse Oximetry (%)      








 Laboratory Tests











  11/14/17 11/14/17 11/15/17





  07:00 21:30 07:00


 


WBC   


 


RBC   


 


Hgb   


 


Hct   


 


MCV   


 


MCH   


 


MCHC   


 


RDW   


 


Plt Count   


 


MPV   


 


Sodium   


 


Potassium   


 


Chloride   


 


Carbon Dioxide   


 


Anion Gap   


 


BUN   


 


Creatinine   


 


Creat Clearance w eGFR   


 


Random Glucose   


 


Calcium   


 


Total Bilirubin   


 


AST   


 


ALT   


 


Alkaline Phosphatase   


 


Total Protein   


 


Albumin   


 


Urine Color   Yellow 


 


Urine Appearance   Slcloudy 


 


Urine pH   5.0 


 


Ur Specific Gravity   1.027 


 


Urine Protein   Negative 


 


Urine Glucose (UA)   Negative 


 


Urine Ketones   Negative 


 


Urine Blood   Negative 


 


Urine Nitrite   Negative 


 


Urine Bilirubin   Negative 


 


Urine Urobilinogen   Negative 


 


Ur Leukocyte Esterase   Negative 


 


RPR Titer   


 


Hepatitis C Antibody  <0.1  


 


HIV 1&2 Antibody Screen    Negative


 


HIV P24 Antigen    Negative














  11/15/17 11/15/17 11/15/17





  07:00 07:00 07:00


 


WBC  6.6  


 


RBC  4.40  


 


Hgb  12.9  


 


Hct  40.1  


 


MCV  91.0  


 


MCH  29.3  


 


MCHC  32.2  


 


RDW  13.9  


 


Plt Count  249  


 


MPV  9.5  


 


Sodium   143 


 


Potassium   4.7 


 


Chloride   107 


 


Carbon Dioxide   27 


 


Anion Gap   9 


 


BUN   12  D 


 


Creatinine   0.7 


 


Creat Clearance w eGFR   > 60 


 


Random Glucose   70 L 


 


Calcium   8.2 L 


 


Total Bilirubin   0.4  D 


 


AST   7 L 


 


ALT   18 


 


Alkaline Phosphatase   66 


 


Total Protein   6.1 L 


 


Albumin   3.1 L 


 


Urine Color   


 


Urine Appearance   


 


Urine pH   


 


Ur Specific Gravity   


 


Urine Protein   


 


Urine Glucose (UA)   


 


Urine Ketones   


 


Urine Blood   


 


Urine Nitrite   


 


Urine Bilirubin   


 


Urine Urobilinogen   


 


Ur Leukocyte Esterase   


 


RPR Titer    Nonreactive


 


Hepatitis C Antibody   


 


HIV 1&2 Antibody Screen   


 


HIV P24 Antigen   








LABS NOTED.


Assessment: 





11/18/17 14:20


WITHDRAWAL SYMPTOMS.


Plan: 





CONTINUE DETOX.


DUE TO SEVERITY OF CURRENT DETOX SYMPTOMS, PATIENT PERMITTED TO REMAIN ON DETOX 

UNIT UNTIL 11/20/2017 - 11/21/2017 PENDING UPCOMING DAILY MEDICAL EVALUATION.

## 2017-11-19 RX ADMIN — Medication SCH TAB: at 10:12

## 2017-11-19 RX ADMIN — QUETIAPINE FUMARATE SCH MG: 100 TABLET ORAL at 22:01

## 2017-11-19 RX ADMIN — Medication SCH MG: at 22:01

## 2017-11-19 NOTE — PN
BHS Progress Note (SOAP)


Subjective: 





Tremor, sweating, stomach and back ache


Objective: 





11/19/17 14:17


 Last Vital Signs











Temp Pulse Resp BP Pulse Ox


 


 98.2 F   102 H  18   115/80    


 


 11/19/17 09:42  11/19/17 09:42  11/19/17 09:42  11/19/17 09:42   








 Laboratory Tests











  11/14/17 11/14/17 11/15/17





  07:00 21:30 07:00


 


WBC   


 


RBC   


 


Hgb   


 


Hct   


 


MCV   


 


MCH   


 


MCHC   


 


RDW   


 


Plt Count   


 


MPV   


 


Sodium   


 


Potassium   


 


Chloride   


 


Carbon Dioxide   


 


Anion Gap   


 


BUN   


 


Creatinine   


 


Creat Clearance w eGFR   


 


POC Glucometer   


 


Random Glucose   


 


Calcium   


 


Total Bilirubin   


 


AST   


 


ALT   


 


Alkaline Phosphatase   


 


Total Protein   


 


Albumin   


 


Urine Color   Yellow 


 


Urine Appearance   Slcloudy 


 


Urine pH   5.0 


 


Ur Specific Gravity   1.027 


 


Urine Protein   Negative 


 


Urine Glucose (UA)   Negative 


 


Urine Ketones   Negative 


 


Urine Blood   Negative 


 


Urine Nitrite   Negative 


 


Urine Bilirubin   Negative 


 


Urine Urobilinogen   Negative 


 


Ur Leukocyte Esterase   Negative 


 


RPR Titer   


 


Hepatitis C Antibody  <0.1  


 


HIV 1&2 Antibody Screen    Negative


 


HIV P24 Antigen    Negative














  11/15/17 11/15/17 11/15/17





  07:00 07:00 07:00


 


WBC  6.6  


 


RBC  4.40  


 


Hgb  12.9  


 


Hct  40.1  


 


MCV  91.0  


 


MCH  29.3  


 


MCHC  32.2  


 


RDW  13.9  


 


Plt Count  249  


 


MPV  9.5  


 


Sodium   143 


 


Potassium   4.7 


 


Chloride   107 


 


Carbon Dioxide   27 


 


Anion Gap   9 


 


BUN   12  D 


 


Creatinine   0.7 


 


Creat Clearance w eGFR   > 60 


 


POC Glucometer   


 


Random Glucose   70 L 


 


Calcium   8.2 L 


 


Total Bilirubin   0.4  D 


 


AST   7 L 


 


ALT   18 


 


Alkaline Phosphatase   66 


 


Total Protein   6.1 L 


 


Albumin   3.1 L 


 


Urine Color   


 


Urine Appearance   


 


Urine pH   


 


Ur Specific Gravity   


 


Urine Protein   


 


Urine Glucose (UA)   


 


Urine Ketones   


 


Urine Blood   


 


Urine Nitrite   


 


Urine Bilirubin   


 


Urine Urobilinogen   


 


Ur Leukocyte Esterase   


 


RPR Titer    Nonreactive


 


Hepatitis C Antibody   


 


HIV 1&2 Antibody Screen   


 


HIV P24 Antigen   














  11/18/17





  16:31


 


WBC 


 


RBC 


 


Hgb 


 


Hct 


 


MCV 


 


MCH 


 


MCHC 


 


RDW 


 


Plt Count 


 


MPV 


 


Sodium 


 


Potassium 


 


Chloride 


 


Carbon Dioxide 


 


Anion Gap 


 


BUN 


 


Creatinine 


 


Creat Clearance w eGFR 


 


POC Glucometer  104


 


Random Glucose 


 


Calcium 


 


Total Bilirubin 


 


AST 


 


ALT 


 


Alkaline Phosphatase 


 


Total Protein 


 


Albumin 


 


Urine Color 


 


Urine Appearance 


 


Urine pH 


 


Ur Specific Gravity 


 


Urine Protein 


 


Urine Glucose (UA) 


 


Urine Ketones 


 


Urine Blood 


 


Urine Nitrite 


 


Urine Bilirubin 


 


Urine Urobilinogen 


 


Ur Leukocyte Esterase 


 


RPR Titer 


 


Hepatitis C Antibody 


 


HIV 1&2 Antibody Screen 


 


HIV P24 Antigen 








Labs noted


Assessment: 





11/19/17 14:17


Withdrawal symptoms


Plan: 





Continue detox


Encouraged to drink lots of water

## 2017-11-20 ENCOUNTER — HOSPITAL ENCOUNTER (INPATIENT)
Dept: HOSPITAL 74 - YASAS | Age: 52
LOS: 11 days | Discharge: HOME | DRG: 772 | End: 2017-12-01
Attending: PSYCHIATRY & NEUROLOGY | Admitting: PSYCHIATRY & NEUROLOGY
Payer: COMMERCIAL

## 2017-11-20 VITALS — DIASTOLIC BLOOD PRESSURE: 84 MMHG | HEART RATE: 88 BPM | TEMPERATURE: 96.5 F | SYSTOLIC BLOOD PRESSURE: 123 MMHG

## 2017-11-20 VITALS — BODY MASS INDEX: 23.6 KG/M2

## 2017-11-20 DIAGNOSIS — F39: ICD-10-CM

## 2017-11-20 DIAGNOSIS — E78.00: ICD-10-CM

## 2017-11-20 DIAGNOSIS — F10.20: ICD-10-CM

## 2017-11-20 DIAGNOSIS — I10: ICD-10-CM

## 2017-11-20 DIAGNOSIS — F19.282: ICD-10-CM

## 2017-11-20 DIAGNOSIS — Z59.0: ICD-10-CM

## 2017-11-20 DIAGNOSIS — F33.9: ICD-10-CM

## 2017-11-20 DIAGNOSIS — F17.210: ICD-10-CM

## 2017-11-20 DIAGNOSIS — F11.20: Primary | ICD-10-CM

## 2017-11-20 DIAGNOSIS — I25.2: ICD-10-CM

## 2017-11-20 PROCEDURE — HZ42ZZZ GROUP COUNSELING FOR SUBSTANCE ABUSE TREATMENT, COGNITIVE-BEHAVIORAL: ICD-10-PCS | Performed by: PSYCHIATRY & NEUROLOGY

## 2017-11-20 RX ADMIN — Medication SCH TAB: at 10:03

## 2017-11-20 RX ADMIN — QUETIAPINE FUMARATE SCH MG: 100 TABLET ORAL at 21:54

## 2017-11-20 RX ADMIN — Medication SCH MG: at 21:54

## 2017-11-20 SDOH — ECONOMIC STABILITY - HOUSING INSECURITY: HOMELESSNESS: Z59.0

## 2017-11-20 NOTE — PN
BHS Progress Note (SOAP)


Subjective: 





Anxious.


Objective: 


PT. A & O X 3, OBSERVED AMBULATING ON UNIT. NO ACUTE DISTRESS.





11/20/17 11:10


 Vital Signs











Temperature  96.5 F L  11/20/17 09:13


 


Pulse Rate  88   11/20/17 09:13


 


Respiratory Rate  18   11/20/17 09:13


 


Blood Pressure  123/84   11/20/17 09:13


 


O2 Sat by Pulse Oximetry (%)      








 Laboratory Tests











  11/14/17 11/14/17 11/15/17





  07:00 21:30 07:00


 


WBC   


 


RBC   


 


Hgb   


 


Hct   


 


MCV   


 


MCH   


 


MCHC   


 


RDW   


 


Plt Count   


 


MPV   


 


Sodium   


 


Potassium   


 


Chloride   


 


Carbon Dioxide   


 


Anion Gap   


 


BUN   


 


Creatinine   


 


Creat Clearance w eGFR   


 


POC Glucometer   


 


Random Glucose   


 


Calcium   


 


Total Bilirubin   


 


AST   


 


ALT   


 


Alkaline Phosphatase   


 


Total Protein   


 


Albumin   


 


Urine Color   Yellow 


 


Urine Appearance   Slcloudy 


 


Urine pH   5.0 


 


Ur Specific Gravity   1.027 


 


Urine Protein   Negative 


 


Urine Glucose (UA)   Negative 


 


Urine Ketones   Negative 


 


Urine Blood   Negative 


 


Urine Nitrite   Negative 


 


Urine Bilirubin   Negative 


 


Urine Urobilinogen   Negative 


 


Ur Leukocyte Esterase   Negative 


 


RPR Titer   


 


Hepatitis C Antibody  <0.1  


 


HIV 1&2 Antibody Screen    Negative


 


HIV P24 Antigen    Negative














  11/15/17 11/15/17 11/15/17





  07:00 07:00 07:00


 


WBC  6.6  


 


RBC  4.40  


 


Hgb  12.9  


 


Hct  40.1  


 


MCV  91.0  


 


MCH  29.3  


 


MCHC  32.2  


 


RDW  13.9  


 


Plt Count  249  


 


MPV  9.5  


 


Sodium   143 


 


Potassium   4.7 


 


Chloride   107 


 


Carbon Dioxide   27 


 


Anion Gap   9 


 


BUN   12  D 


 


Creatinine   0.7 


 


Creat Clearance w eGFR   > 60 


 


POC Glucometer   


 


Random Glucose   70 L 


 


Calcium   8.2 L 


 


Total Bilirubin   0.4  D 


 


AST   7 L 


 


ALT   18 


 


Alkaline Phosphatase   66 


 


Total Protein   6.1 L 


 


Albumin   3.1 L 


 


Urine Color   


 


Urine Appearance   


 


Urine pH   


 


Ur Specific Gravity   


 


Urine Protein   


 


Urine Glucose (UA)   


 


Urine Ketones   


 


Urine Blood   


 


Urine Nitrite   


 


Urine Bilirubin   


 


Urine Urobilinogen   


 


Ur Leukocyte Esterase   


 


RPR Titer    Nonreactive


 


Hepatitis C Antibody   


 


HIV 1&2 Antibody Screen   


 


HIV P24 Antigen   














  11/18/17





  16:31


 


WBC 


 


RBC 


 


Hgb 


 


Hct 


 


MCV 


 


MCH 


 


MCHC 


 


RDW 


 


Plt Count 


 


MPV 


 


Sodium 


 


Potassium 


 


Chloride 


 


Carbon Dioxide 


 


Anion Gap 


 


BUN 


 


Creatinine 


 


Creat Clearance w eGFR 


 


POC Glucometer  104


 


Random Glucose 


 


Calcium 


 


Total Bilirubin 


 


AST 


 


ALT 


 


Alkaline Phosphatase 


 


Total Protein 


 


Albumin 


 


Urine Color 


 


Urine Appearance 


 


Urine pH 


 


Ur Specific Gravity 


 


Urine Protein 


 


Urine Glucose (UA) 


 


Urine Ketones 


 


Urine Blood 


 


Urine Nitrite 


 


Urine Bilirubin 


 


Urine Urobilinogen 


 


Ur Leukocyte Esterase 


 


RPR Titer 


 


Hepatitis C Antibody 


 


HIV 1&2 Antibody Screen 


 


HIV P24 Antigen 








LABS NOTED.


Assessment: 





11/20/17 11:11


WITHDRAWAL SYMPTOMS.


Plan: 





CONTINUE DETOX.


PATIENT REMAINING ON DETOX UNIT WHILE WAITS FOR POSSIBLE BED AVAILABILITY AT 

[facility].

## 2017-11-20 NOTE — DS
[facility]HS Detox Discharge Summary


Admission Date: 


11/14/17





Discharge Date: 11/20/17





- History


Present History: Alcohol Dependence, Opioid Dependence


Additional Comments: 





PATIENT GOING TO [facility] FOR AFTERCARE. PATIENT WAS DISCHARGED 

FROM DETOX UNIT TO BE TAKEN OVER TO REHAB UNIT IN STABLE MEDICAL CONDITION.


Pertinent Past History: 





HTN, Hypercholesterolemia, History of MI, Nicotine Dependence, Insomnia.





- Physical Exam Results


Vital Signs: 


 Vital Signs











Temperature  96.5 F L  11/20/17 09:13


 


Pulse Rate  88   11/20/17 09:13


 


Respiratory Rate  18   11/20/17 09:13


 


Blood Pressure  123/84   11/20/17 09:13


 


O2 Sat by Pulse Oximetry (%)      











Pertinent Admission Physical Exam Findings: 





WITHDRAWAL SYMPTOMS.





 Laboratory Tests











  11/14/17 11/14/17 11/15/17





  07:00 21:30 07:00


 


WBC   


 


RBC   


 


Hgb   


 


Hct   


 


MCV   


 


MCH   


 


MCHC   


 


RDW   


 


Plt Count   


 


MPV   


 


Sodium   


 


Potassium   


 


Chloride   


 


Carbon Dioxide   


 


Anion Gap   


 


BUN   


 


Creatinine   


 


Creat Clearance w eGFR   


 


POC Glucometer   


 


Random Glucose   


 


Calcium   


 


Total Bilirubin   


 


AST   


 


ALT   


 


Alkaline Phosphatase   


 


Total Protein   


 


Albumin   


 


Urine Color   Yellow 


 


Urine Appearance   Slcloudy 


 


Urine pH   5.0 


 


Ur Specific Gravity   1.027 


 


Urine Protein   Negative 


 


Urine Glucose (UA)   Negative 


 


Urine Ketones   Negative 


 


Urine Blood   Negative 


 


Urine Nitrite   Negative 


 


Urine Bilirubin   Negative 


 


Urine Urobilinogen   Negative 


 


Ur Leukocyte Esterase   Negative 


 


RPR Titer   


 


Hepatitis C Antibody  <0.1  


 


HIV 1&2 Antibody Screen    Negative


 


HIV P24 Antigen    Negative














  11/15/17 11/15/17 11/15/17





  07:00 07:00 07:00


 


WBC  6.6  


 


RBC  4.40  


 


Hgb  12.9  


 


Hct  40.1  


 


MCV  91.0  


 


MCH  29.3  


 


MCHC  32.2  


 


RDW  13.9  


 


Plt Count  249  


 


MPV  9.5  


 


Sodium   143 


 


Potassium   4.7 


 


Chloride   107 


 


Carbon Dioxide   27 


 


Anion Gap   9 


 


BUN   12  D 


 


Creatinine   0.7 


 


Creat Clearance w eGFR   > 60 


 


POC Glucometer   


 


Random Glucose   70 L 


 


Calcium   8.2 L 


 


Total Bilirubin   0.4  D 


 


AST   7 L 


 


ALT   18 


 


Alkaline Phosphatase   66 


 


Total Protein   6.1 L 


 


Albumin   3.1 L 


 


Urine Color   


 


Urine Appearance   


 


Urine pH   


 


Ur Specific Gravity   


 


Urine Protein   


 


Urine Glucose (UA)   


 


Urine Ketones   


 


Urine Blood   


 


Urine Nitrite   


 


Urine Bilirubin   


 


Urine Urobilinogen   


 


Ur Leukocyte Esterase   


 


RPR Titer    Nonreactive


 


Hepatitis C Antibody   


 


HIV 1&2 Antibody Screen   


 


HIV P24 Antigen   














  11/18/17





  16:31


 


WBC 


 


RBC 


 


Hgb 


 


Hct 


 


MCV 


 


MCH 


 


MCHC 


 


RDW 


 


Plt Count 


 


MPV 


 


Sodium 


 


Potassium 


 


Chloride 


 


Carbon Dioxide 


 


Anion Gap 


 


BUN 


 


Creatinine 


 


Creat Clearance w eGFR 


 


POC Glucometer  104


 


Random Glucose 


 


Calcium 


 


Total Bilirubin 


 


AST 


 


ALT 


 


Alkaline Phosphatase 


 


Total Protein 


 


Albumin 


 


Urine Color 


 


Urine Appearance 


 


Urine pH 


 


Ur Specific Gravity 


 


Urine Protein 


 


Urine Glucose (UA) 


 


Urine Ketones 


 


Urine Blood 


 


Urine Nitrite 


 


Urine Bilirubin 


 


Urine Urobilinogen 


 


Ur Leukocyte Esterase 


 


RPR Titer 


 


Hepatitis C Antibody 


 


HIV 1&2 Antibody Screen 


 


HIV P24 Antigen 








LABS NOTED.





- Treatment


Hospital Course: Detox Protocol Followed, Detoxed Safely, Responded well, 

Discharged Condition Good, Rehab Referral Accepted


Patient has Accepted a Rehab Referral to: [facility].





- Diagnosis


(1) Alcohol dependence with uncomplicated withdrawal


Current Visit: Yes   Status: Acute   





(2) Opioid dependence with withdrawal


Current Visit: Yes   Status: Acute   





(3) Drug-induced mood disorder


Current Visit: Yes   Status: Acute   





(4) Insomnia


Current Visit: Yes   Status: Acute   


Qualifiers: 


   Insomnia type: unspecified   Qualified Code(s): G47.00 - Insomnia, 

unspecified   





(5) Weight loss


Current Visit: Yes   Status: Acute   





(6) Hypercholesterolemia


Current Visit: Yes   Status: Chronic   





(7) Hypertension


Current Visit: Yes   Status: Chronic   


Qualifiers: 


   Hypertension type: essential hypertension   Qualified Code(s): I10 - 

Essential (primary) hypertension   





(8) Nicotine dependence, uncomplicated


Current Visit: Yes   Status: Chronic   


Qualifiers: 


   Nicotine product type: cigarettes   Qualified Code(s): F17.210 - Nicotine 

dependence, cigarettes, uncomplicated   





- AMA


Did Patient Leave Against Medical Advice: No

## 2017-11-20 NOTE — HP
[facility] MD Rehab Assess/Revision





- Admission History


Admitted to Rehab from: [facility]


Date of Admission to Rehab: 11/20/2017





- Vital signs


Vital Signs: 





NOTED; STABLE.





- Findings


Detox History & Physical reviewed: Yes


Concur with findings: Yes


Comments/Additional Findings: PATIENT'S MEDICAL / MEDICATION HISTORY REVIEWED 

PRIOR TO DISCHARGE FROM DETOX. PATIENT WAS TAKEN FROM DETOX UNIT TO REHAB UNIT 

IN STABLE MEDICAL CONDITION.





Inpatient Rehab Admission





- Initial Determination


Are CD services needed?: Yes


Free of communicable disease: Yes


Not in need of hospitalization: Yes





- Rehab Admission Criteria


Previous failed treatment: Yes


Comorbidities: Yes


Patient is meeting Inpatient Rehab admission criteria:: Yes

## 2017-11-20 NOTE — PN
[facility]HS Progress Note


Note: 





received nurse call that patient transferred from  needs admission order


chart reviewed


admission order completed by Mario

## 2017-11-21 RX ADMIN — QUETIAPINE FUMARATE SCH MG: 100 TABLET ORAL at 21:46

## 2017-11-21 RX ADMIN — Medication SCH: at 09:49

## 2017-11-21 RX ADMIN — HYDROXYZINE PAMOATE PRN MG: 25 CAPSULE ORAL at 17:54

## 2017-11-21 RX ADMIN — Medication SCH MG: at 21:46

## 2017-11-21 NOTE — HP
Psychiatrist Admission





- Data


Date of interview: 11/21/17


Admission source: [facility]


Identifying data: This is one of the multiple [facility] admission for this 52 years old single  male, father of 

5 living children, unemployed on food stamp, homeless


Medical History: Significant for myocardial infarction, hypertension and 

dyslipidemia. Smokes 15 cigarettes daily


Psychiatric History: Reports that his first psychiatric contact was in 1999 

when he was admitted to [facility] for suicidal attempt(trying to jump on 

a train track) in the context of PCP intoxication. Claims that he was kept for 

14 days and discharged on no psychotropic medication. Reports a second 

psychiatric admission in December 2016 to [facility] in [place]. This 

admission was precipitated by the murder of her daughter in October 2016. 

Claims that he was hospitalized for 11 days and treated with Seroquel and 

Trazadone. He said that on discharge, he was referred to an inpatient rehab 

facility where he could only stay 10 days because of medical insurance coverage 

issue. Told writer that he continued to take Seroquel till he came to [place] 3-4 

months ago. He saw Dr Perez on 11/15/17  while in detox in this facility and 

Seroquel was resumed at 100 mg po HS. At present, reports feeling depressed , 

anxious and sleeping poorly


Physical/Sexual Abuse/Trauma History: Denies history of sexual, physical or 

verbal abuse as well as DV relationship


Additional Comment: Reports history of multiple arrests including 3 felony 

convictions. Denies being on parole/prbation at present


Vital Signs: 


 Vital Signs - 24 hr











  11/20/17 11/21/17 11/21/17





  15:15 00:30 03:30


 


Temperature 98.6 F  


 


Pulse Rate 98 H  


 


Respiratory 16 18 18





Rate   


 


Blood Pressure 129/89  











Allergies/Adverse Reactions: 


 Allergies











Allergy/AdvReac Type Severity Reaction Status Date / Time


 


shellfish derived Allergy Severe Swelling Verified 11/20/17 15:44


 


Fish Containing Products Allergy Mild Rash Verified 11/14/17 18:57


 


No Known Drug Allergies Allergy   Verified 11/14/17 18:57


 


cheese AdvReac Intermediate Swelling Verified 11/14/17 18:57











Date of last physical exam: 11/14/17


Concur with the findings of this exam: Yes





- Substance Abuse/Tx History


Hx Alcohol Use: Yes


Hx Substance Use: Yes


Substance Use Type: Alcohol (Started drinking alcohol at age 15, consumes one 

pint of liquor & a 6pk of beer daily. Last drank on 11/14/17), Heroin (Started 

using heroin at age 11, consumes 4 bags daily. Last used on 11/13/17)


Hx Substance Use Treatment: Yes (6 previous inpt detox & 3 inpt rehab 

admissions [place] [facility]





Mental Status Exam





- Mental Status Exam


Alert and Oriented to: Time, Place, Person


Cognitive Function: Fair


Patient Appearance: Well Groomed


Mood: Depressed, Anxious


Affect: Appropriate


Patient Behavior: Cooperative


Speech Pattern: Clear


Voice Loudness: Normal


Thought Process: Intact, Goal Oriented


Thought Disorder: Not Present


Hallucinations: Denies


Suicidal Ideation: Denies


Homicidal Ideation: Denies


Insight/Judgement: Fair


Sleep: Poorly


Appetite: Good


Muscle strength/Tone: Normal


Gait/Station: Normal





Psychiatric Findings





- Problem List (Axis 1, 2,3)


(1) Alcohol dependence


Current Visit: Yes   Status: Acute   





(2) Opioid dependence


Current Visit: Yes   Status: Acute   





(3) Nicotine dependence, uncomplicated


Current Visit: No   Status: Chronic   


Qualifiers: 


   Nicotine product type: cigarettes   Qualified Code(s): F17.210 - Nicotine 

dependence, cigarettes, uncomplicated   





(4) Mood disorder


Current Visit: Yes   Status: Chronic   





(5) MDD (major depressive disorder)


Current Visit: Yes   Status: Ruled-out   





(6) Substance induced mood disorder


Current Visit: Yes   Status: Acute   





(7) Substance-induced sleep disorder


Current Visit: Yes   Status: Acute   





(8) Hypercholesterolemia


Current Visit: No   Status: Chronic   





(9) Hypertension


Current Visit: No   Status: Chronic   


Qualifiers: 


   Hypertension type: essential hypertension   Qualified Code(s): I10 - 

Essential (primary) hypertension   





(10) History of MI (myocardial infarction)


Current Visit: Yes   Status: Acute   





- Initial Treatment Plan


Initial Treatment Plan: 1) Continue Seroquel 100 mg po HS.  2) Monitor progress

## 2017-11-22 RX ADMIN — QUETIAPINE FUMARATE SCH MG: 100 TABLET ORAL at 21:55

## 2017-11-22 RX ADMIN — CYCLOBENZAPRINE HYDROCHLORIDE SCH MG: 5 TABLET, FILM COATED ORAL at 15:29

## 2017-11-22 RX ADMIN — PANTOPRAZOLE SODIUM SCH MG: 40 TABLET, DELAYED RELEASE ORAL at 11:50

## 2017-11-22 RX ADMIN — Medication SCH MG: at 21:55

## 2017-11-22 RX ADMIN — BUPRENORPHINE HYDROCHLORIDE, NALOXONE HYDROCHLORIDE SCH EACH: 2; .5 FILM, SOLUBLE BUCCAL; SUBLINGUAL at 11:51

## 2017-11-22 RX ADMIN — NAPROXEN SCH MG: 500 TABLET ORAL at 21:58

## 2017-11-22 RX ADMIN — Medication SCH TAB: at 10:03

## 2017-11-22 RX ADMIN — HYDROXYZINE PAMOATE PRN MG: 25 CAPSULE ORAL at 10:03

## 2017-11-22 RX ADMIN — NAPROXEN SCH MG: 500 TABLET ORAL at 11:51

## 2017-11-22 RX ADMIN — CYCLOBENZAPRINE HYDROCHLORIDE SCH MG: 5 TABLET, FILM COATED ORAL at 21:55

## 2017-11-22 RX ADMIN — CYCLOBENZAPRINE HYDROCHLORIDE SCH MG: 5 TABLET, FILM COATED ORAL at 11:50

## 2017-11-22 NOTE — PN
BHS Progress Note


Note: 





c/o cravings and cotninued opioid withdrawwal sx, would like to start suboxone 

but homeless


 Vital Signs - 8 hr











  11/22/17 11/22/17 11/22/17





  03:30 07:16 10:00


 


Temperature  98.1 F 


 


Pulse Rate  88 97 H


 


Respiratory 18 18 18





Rate   


 


Blood Pressure  123/76 122/80








labs reviewed


will start suboxone 2mg daily, risks and benefits discussed.  Patient 

requesting long term detox that administers suboxone.  when discharge plans 

made can increase dose so that cravings are relieved. symptomatic relief of 

withdrawal ordered.

## 2017-11-23 RX ADMIN — CYCLOBENZAPRINE HYDROCHLORIDE SCH MG: 5 TABLET, FILM COATED ORAL at 06:14

## 2017-11-23 RX ADMIN — PANTOPRAZOLE SODIUM SCH MG: 40 TABLET, DELAYED RELEASE ORAL at 09:54

## 2017-11-23 RX ADMIN — NAPROXEN SCH MG: 500 TABLET ORAL at 21:41

## 2017-11-23 RX ADMIN — CYCLOBENZAPRINE HYDROCHLORIDE SCH MG: 5 TABLET, FILM COATED ORAL at 14:06

## 2017-11-23 RX ADMIN — QUETIAPINE FUMARATE SCH MG: 100 TABLET ORAL at 21:41

## 2017-11-23 RX ADMIN — Medication SCH MG: at 21:41

## 2017-11-23 RX ADMIN — Medication SCH TAB: at 09:54

## 2017-11-23 RX ADMIN — CYCLOBENZAPRINE HYDROCHLORIDE SCH MG: 5 TABLET, FILM COATED ORAL at 21:41

## 2017-11-23 RX ADMIN — BUPRENORPHINE HYDROCHLORIDE, NALOXONE HYDROCHLORIDE SCH EACH: 2; .5 FILM, SOLUBLE BUCCAL; SUBLINGUAL at 09:54

## 2017-11-23 RX ADMIN — NAPROXEN SCH MG: 500 TABLET ORAL at 09:54

## 2017-11-24 RX ADMIN — NAPROXEN SCH MG: 500 TABLET ORAL at 21:40

## 2017-11-24 RX ADMIN — PANTOPRAZOLE SODIUM SCH MG: 40 TABLET, DELAYED RELEASE ORAL at 10:08

## 2017-11-24 RX ADMIN — CYCLOBENZAPRINE HYDROCHLORIDE SCH MG: 5 TABLET, FILM COATED ORAL at 21:40

## 2017-11-24 RX ADMIN — CYCLOBENZAPRINE HYDROCHLORIDE SCH MG: 5 TABLET, FILM COATED ORAL at 06:40

## 2017-11-24 RX ADMIN — CYCLOBENZAPRINE HYDROCHLORIDE SCH MG: 5 TABLET, FILM COATED ORAL at 13:32

## 2017-11-24 RX ADMIN — BUPRENORPHINE HYDROCHLORIDE, NALOXONE HYDROCHLORIDE SCH EACH: 2; .5 FILM, SOLUBLE BUCCAL; SUBLINGUAL at 10:07

## 2017-11-24 RX ADMIN — NAPROXEN SCH: 500 TABLET ORAL at 10:32

## 2017-11-24 RX ADMIN — Medication SCH TAB: at 10:07

## 2017-11-24 RX ADMIN — Medication SCH MG: at 21:41

## 2017-11-24 RX ADMIN — QUETIAPINE FUMARATE SCH MG: 100 TABLET ORAL at 21:41

## 2017-11-25 RX ADMIN — CYCLOBENZAPRINE HYDROCHLORIDE SCH MG: 5 TABLET, FILM COATED ORAL at 21:47

## 2017-11-25 RX ADMIN — Medication SCH MG: at 21:47

## 2017-11-25 RX ADMIN — Medication SCH TAB: at 09:49

## 2017-11-25 RX ADMIN — BUPRENORPHINE HYDROCHLORIDE, NALOXONE HYDROCHLORIDE SCH EACH: 2; .5 FILM, SOLUBLE BUCCAL; SUBLINGUAL at 09:49

## 2017-11-25 RX ADMIN — QUETIAPINE FUMARATE SCH MG: 100 TABLET ORAL at 21:47

## 2017-11-25 RX ADMIN — CYCLOBENZAPRINE HYDROCHLORIDE SCH MG: 5 TABLET, FILM COATED ORAL at 06:12

## 2017-11-25 RX ADMIN — NAPROXEN SCH MG: 500 TABLET ORAL at 09:49

## 2017-11-25 RX ADMIN — CYCLOBENZAPRINE HYDROCHLORIDE SCH MG: 5 TABLET, FILM COATED ORAL at 14:18

## 2017-11-25 RX ADMIN — PANTOPRAZOLE SODIUM SCH MG: 40 TABLET, DELAYED RELEASE ORAL at 09:49

## 2017-11-25 RX ADMIN — NAPROXEN SCH MG: 500 TABLET ORAL at 21:48

## 2017-11-26 RX ADMIN — BUPRENORPHINE HYDROCHLORIDE, NALOXONE HYDROCHLORIDE SCH EACH: 2; .5 FILM, SOLUBLE BUCCAL; SUBLINGUAL at 10:04

## 2017-11-26 RX ADMIN — CYCLOBENZAPRINE HYDROCHLORIDE SCH: 5 TABLET, FILM COATED ORAL at 06:27

## 2017-11-26 RX ADMIN — NAPROXEN SCH MG: 500 TABLET ORAL at 21:40

## 2017-11-26 RX ADMIN — PANTOPRAZOLE SODIUM SCH MG: 40 TABLET, DELAYED RELEASE ORAL at 10:04

## 2017-11-26 RX ADMIN — Medication SCH TAB: at 10:04

## 2017-11-26 RX ADMIN — Medication SCH MG: at 21:40

## 2017-11-26 RX ADMIN — QUETIAPINE FUMARATE SCH MG: 100 TABLET ORAL at 21:40

## 2017-11-26 RX ADMIN — NAPROXEN SCH MG: 500 TABLET ORAL at 10:04

## 2017-11-27 RX ADMIN — Medication SCH TAB: at 09:55

## 2017-11-27 RX ADMIN — PANTOPRAZOLE SODIUM SCH MG: 40 TABLET, DELAYED RELEASE ORAL at 09:55

## 2017-11-27 RX ADMIN — NAPROXEN SCH MG: 500 TABLET ORAL at 21:33

## 2017-11-27 RX ADMIN — QUETIAPINE FUMARATE SCH MG: 100 TABLET ORAL at 21:33

## 2017-11-27 RX ADMIN — BUPRENORPHINE HYDROCHLORIDE, NALOXONE HYDROCHLORIDE SCH EACH: 2; .5 FILM, SOLUBLE BUCCAL; SUBLINGUAL at 09:54

## 2017-11-27 RX ADMIN — Medication SCH MG: at 21:33

## 2017-11-27 RX ADMIN — NAPROXEN SCH MG: 500 TABLET ORAL at 09:54

## 2017-11-28 RX ADMIN — QUETIAPINE FUMARATE SCH MG: 100 TABLET ORAL at 21:33

## 2017-11-28 RX ADMIN — BUPRENORPHINE HYDROCHLORIDE, NALOXONE HYDROCHLORIDE SCH EACH: 2; .5 FILM, SOLUBLE BUCCAL; SUBLINGUAL at 10:13

## 2017-11-28 RX ADMIN — NAPROXEN SCH MG: 500 TABLET ORAL at 10:14

## 2017-11-28 RX ADMIN — NAPROXEN SCH MG: 500 TABLET ORAL at 21:34

## 2017-11-28 RX ADMIN — Medication SCH TAB: at 10:14

## 2017-11-28 RX ADMIN — PANTOPRAZOLE SODIUM SCH MG: 40 TABLET, DELAYED RELEASE ORAL at 10:14

## 2017-11-28 RX ADMIN — Medication SCH MG: at 21:33

## 2017-11-29 RX ADMIN — QUETIAPINE FUMARATE SCH MG: 100 TABLET ORAL at 21:34

## 2017-11-29 RX ADMIN — NAPROXEN SCH MG: 500 TABLET ORAL at 21:34

## 2017-11-29 RX ADMIN — BUPRENORPHINE HYDROCHLORIDE, NALOXONE HYDROCHLORIDE SCH EACH: 2; .5 FILM, SOLUBLE BUCCAL; SUBLINGUAL at 10:03

## 2017-11-29 RX ADMIN — Medication SCH TAB: at 10:02

## 2017-11-29 RX ADMIN — PANTOPRAZOLE SODIUM SCH MG: 40 TABLET, DELAYED RELEASE ORAL at 10:02

## 2017-11-29 RX ADMIN — Medication SCH MG: at 21:34

## 2017-11-29 RX ADMIN — NAPROXEN SCH MG: 500 TABLET ORAL at 10:03

## 2017-11-30 RX ADMIN — QUETIAPINE FUMARATE SCH MG: 100 TABLET ORAL at 21:54

## 2017-11-30 RX ADMIN — Medication SCH MG: at 21:54

## 2017-11-30 RX ADMIN — PANTOPRAZOLE SODIUM SCH MG: 40 TABLET, DELAYED RELEASE ORAL at 09:45

## 2017-11-30 RX ADMIN — NAPROXEN SCH MG: 500 TABLET ORAL at 21:54

## 2017-11-30 RX ADMIN — Medication SCH TAB: at 09:45

## 2017-11-30 RX ADMIN — NAPROXEN SCH MG: 500 TABLET ORAL at 09:45

## 2017-11-30 NOTE — PN
Psychiatric Progress Note


Vital Signs: 


 Vital Signs











 Period  Temp  Pulse  Resp  BP Sys/Connelly  Pulse Ox


 


 Last 24 Hr  97.6 F  64-73  18-18  123-148/78-85  











Date of Session: 11/30/17


Chief Complaint:: discharge visit


HPI: Patient is addressing alcohol, opioid, nicotine dependence comorbid MDD, 

substance induced sleep disorder


ROS: myocardial infarction, hypertension and dyslipidemia medically managed


Current Medications: 


Active Medications











Generic Name Dose Route Start Last Admin





  Trade Name Freq  PRN Reason Stop Dose Admin


 


Acetaminophen  650 mg  11/20/17 13:10  





  Tylenol -  PO   





  Q4H PRN   





  FEVER OR PAIN   


 


Al Hydroxide/Mg Hydroxide  30 ml  11/20/17 13:10  





  Mylanta Oral Suspension -  PO   





  Q6H PRN   





  DYSPEPSIA   


 


Clonidine  0.1 mg  11/22/17 11:00  11/30/17 09:45





  Catapres -  PO   0.1 mg





  BID ROWENA   Administration


 


Cyclobenzaprine HCl  5 mg  11/26/17 07:30  





  Cyclobenzaprine Hcl  PO   





  Q8H PRN   





  MUSCLE SPASMS   


 


Eucalyptus/Menthol/Phenol/Sorbitol  1 each  11/20/17 13:10  





  Cepastat Lozenge -  MM   





  Q4H PRN   





  SORE THROAT   


 


Guaifenesin  10 ml  11/20/17 13:10  





  Robitussin Dm -  PO   





  Q6H PRN   





  COUGH   


 


Hydroxyzine Pamoate  25 mg  11/20/17 13:10  11/22/17 10:03





  Vistaril -  PO   25 mg





  Q4H PRN   Administration





  AGITATION   


 


Loperamide HCl  4 mg  11/20/17 13:10  





  Imodium -  PO   





  Q6H PRN   





  DIARRHEA   


 


Magnesium Citrate  300 ml  11/20/17 13:10  





  Citroma -  PO   





  Q48H PRN   





  CONSTIPATION   


 


Magnesium Hydroxide  30 ml  11/20/17 13:10  





  Milk Of Magnesia -  PO   





  DAILY PRN   





  CONSTIPATION   


 


Naproxen  500 mg  11/22/17 10:45  11/30/17 09:45





  Naprosyn -  PO   500 mg





  BID ROWENA   Administration


 


Pantoprazole Sodium  40 mg  11/22/17 10:45  11/30/17 09:45





  Protonix -  PO   40 mg





  DAILY ROWENA   Administration


 


Prenatal Multivit/Folic Acid/Iron  1 tab  11/21/17 10:00  11/30/17 09:45





  Prenatal Vitamins (Sjr) -  PO   1 tab





  DAILY ROWENA   Administration


 


Pseudoephedrine/Triprolidine  1 combo  11/20/17 13:10  





  Actifed -  PO   





  TID PRN   





  NASAL CONGESTION   


 


Quetiapine Fumarate  100 mg  11/20/17 22:00  11/29/17 21:34





  Seroquel -  PO   100 mg





  HS ROWENA   Administration


 


Thiamine HCl  100 mg  11/20/17 22:00  11/29/17 21:34





  Vitamin B1 -  PO   100 mg





  HS ROWENA   Administration











Current Side Effect: No


Lab tests ordered: No


Lab tests reviewed: Yes


Provider note:: Patient will complete this treatment and meet his goals on 12/1/ 17. He will continue to address his issues at [facility] long term inpatient treatment 

program. He is motivated to continue maintain abstinence and verbalized 

understanding the negative consequences of his addition on his major life 

areas. Patient was encouraged to utilze all supports to prevent relapses. 

Seroquel well tolerated, scripts prvided for 3 days, patient is stable for 

discharge tomorrow.


Total face to face time:: 25





Mental Status Exam





- Mental Status Exam


Alert and Oriented to: Time, Place, Person


Cognitive Function: Good


Patient Appearance: Well Groomed


Mood: Hopeful


Affect: Appropriate, Mood Congruent


Patient Behavior: Appropriate, Cooperative


Speech Pattern: Clear, Appropriate


Voice Loudness: Normal


Thought Process: Intact


Thought Disorder: Not Present


Hallucinations: Denies


Suicidal Ideation: Denies


Homicidal Ideation: Denies


Insight/Judgement: Fair


Sleep: Fair


Appetite: Fair


Muscle strength/Tone: Normal


Gait/Station: Normal





Psychiatric Treatment Plan





- Problem List


(1) Alcohol dependence


Current Visit: Yes   





(2) Opioid dependence


Current Visit: Yes   





(3) Substance-induced sleep disorder


Current Visit: Yes   





(4) MDD (major depressive disorder)


Current Visit: Yes

## 2017-12-01 VITALS — TEMPERATURE: 97.5 F | DIASTOLIC BLOOD PRESSURE: 81 MMHG | HEART RATE: 70 BPM | SYSTOLIC BLOOD PRESSURE: 145 MMHG

## 2018-01-17 ENCOUNTER — HOSPITAL ENCOUNTER (INPATIENT)
Dept: HOSPITAL 74 - YASAS | Age: 53
LOS: 5 days | Discharge: HOME | DRG: 773 | End: 2018-01-22
Attending: INTERNAL MEDICINE | Admitting: INTERNAL MEDICINE
Payer: COMMERCIAL

## 2018-01-17 VITALS — BODY MASS INDEX: 23.8 KG/M2

## 2018-01-17 DIAGNOSIS — R25.2: ICD-10-CM

## 2018-01-17 DIAGNOSIS — F19.282: ICD-10-CM

## 2018-01-17 DIAGNOSIS — R73.9: ICD-10-CM

## 2018-01-17 DIAGNOSIS — E78.00: ICD-10-CM

## 2018-01-17 DIAGNOSIS — F19.24: ICD-10-CM

## 2018-01-17 DIAGNOSIS — F11.23: Primary | ICD-10-CM

## 2018-01-17 DIAGNOSIS — F41.8: ICD-10-CM

## 2018-01-17 DIAGNOSIS — F10.230: ICD-10-CM

## 2018-01-17 DIAGNOSIS — F17.210: ICD-10-CM

## 2018-01-17 DIAGNOSIS — Z91.011: ICD-10-CM

## 2018-01-17 DIAGNOSIS — F39: ICD-10-CM

## 2018-01-17 DIAGNOSIS — I25.2: ICD-10-CM

## 2018-01-17 DIAGNOSIS — I10: ICD-10-CM

## 2018-01-17 DIAGNOSIS — Z91.013: ICD-10-CM

## 2018-01-17 LAB
APPEARANCE UR: CLEAR
BILIRUB UR STRIP.AUTO-MCNC: NEGATIVE MG/DL
COLOR UR: YELLOW
KETONES UR QL STRIP: (no result)
LEUKOCYTE ESTERASE UR QL STRIP.AUTO: NEGATIVE
NITRITE UR QL STRIP: NEGATIVE
PH UR: 7 [PH] (ref 5–8)
PROT UR QL STRIP: NEGATIVE
PROT UR QL STRIP: NEGATIVE
RBC # UR STRIP: NEGATIVE /UL
SP GR UR: 1.02 (ref 1–1.03)
UROBILINOGEN UR STRIP-MCNC: (no result) MG/DL (ref 0.2–1)

## 2018-01-17 PROCEDURE — HZ2ZZZZ DETOXIFICATION SERVICES FOR SUBSTANCE ABUSE TREATMENT: ICD-10-PCS | Performed by: INTERNAL MEDICINE

## 2018-01-17 RX ADMIN — Medication SCH MG: at 22:10

## 2018-01-17 RX ADMIN — NICOTINE SCH: 21 PATCH TRANSDERMAL at 17:50

## 2018-01-17 NOTE — HP
COWS





- Scale


Resting Pulse: 0= GA 80 or Below


Sweatin= Chills/Flushing


Restless Observation: 1= Difficult to Sit Still


Pupil Size: 0= Normal to Room Light


Bone or Joint Aches: 2= Severe Diffuse Aches


Runny Nose/ Eye Tearin= Nasal Congestion


GI Upset > 30mins: 2= Nausea/Diarrhea


Tremor Observation: 2= Slight Tremor Visible


Yawning Observation: 1= 1-2x During Session


Anxiety or Irritability: 2=Irritable/Anxious


Goose Flesh Skin: 3=Piloerection


COWS Score: 15





Admission ROS S





- HPI


Chief Complaint: 





"I'm here to stop using Heroin. I hope that I can get to Rehab after Detox."


Patient is here to Detox from Heroin.


Allergies/Adverse Reactions: 


 Allergies











Allergy/AdvReac Type Severity Reaction Status Date / Time


 


shellfish derived Allergy Severe Swelling Verified 17 15:44


 


tomato Allergy Severe Swelling Verified 18 12:22


 


Fish Containing Products Allergy Mild Rash Verified 17 18:57


 


No Known Drug Allergies Allergy   Verified 17 18:57


 


cheese AdvReac Intermediate Swelling Verified 17 18:57











History of Present Illness: 





Pt. is a 53 YO male here to Detox from Heroin. Patient has had several previous 

Detox / Rehab admissions at [facility] in the past (most recent: 2017.) Longest 

period of non-drug use in recent years: approx. 2 years ( - ).


Exam Limitations: No Limitations





- Ebola screening


Have you traveled outside of the country in the last 21 days: No


Have you had contact with anyone from an Ebola affected area: No


Have you been sick,other than usual withdrawal symptoms: No


Do you have a fever: No





- Review of Systems


Constitutional: Chills, Diaphoresis, Fever, Loss of Appetite, Malaise, Night 

Sweats, Changes in sleep, Unexplained wgt Loss (Lost approx. 10 -12 lbs. over 

last 6 months.)


EENT: reports: Blurred Vision, Nose Congestion, Sinus Pressure


Respiratory: reports: No Symptoms reported


Cardiac: reports: No Symptoms Reported


GI: reports: Diarrhea, Poor Appetite, Abdominal cramping


: reports: No Symptoms Reported


Musculoskeletal: reports: Joint Pain, Joint Stiffness


Integumentary: reports: No Symptoms Reported


Neuro: reports: Headache, Tremors


Endocrine: reports: No Symptoms Reported


Hematology: reports: No Symptoms Reported


Psychiatric: reports: Judgement Intact, Mood/Affect Appropiate, Orientated x3, 

Anxious, Depressed (And Anxiety; Took medication in past (unable to recall name)

; not taking currently, stopped approx. 7 years ago.)


Other Systems: Reviewed and Negative





Patient History





- Patient Medical History


Hx Anemia: No


Hx Asthma: No


Hx Chronic Obstructive Pulmonary Disease (COPD): No


Hx Cancer: No


Hx Cardiac Disorders: Yes (MI approx. 10 years ago.)


Hx Congestive Heart Failure: No


Hx Hypertension: Yes (No outside prescribed medication.)


Hx Hypercholesterolemia: Yes (LIPITOR 20MG LAST TAKEN 6 MONTHS AGO)


Hx Pacemaker: No


HX Cerebrovascular Accident: No


Hx Seizures: No


Hx Dementia: No


Hx Diabetes: No


Hx Gastrointestinal Disorders: No


Hx Liver Disease: No


Hx Genitourinary Disorders: No


Hx Sexually Transmitted Disorders: No


Hx Renal Disease (ESRD): No


Hx Thyroid Disease: No


Hx Human Immunodeficiency Virus (HIV): No (Last Tested: : NEGATIVE)


Hx Hepatitis C: No (Last Tested approx. 3 months ago: NEGATIVE)


Hx Depression: Yes (Medication in past, not currently.)


Hx Suicide Attempt: No (PATIENT DENIES CURRENT SI / HI.)


Hx Bipolar Disorder: No


Hx Schizophrenia: No


Other Medical History: DENIES.





- Patient Surgical History


Past Surgical History: No


Hx Neurologic Surgery: No


Hx Cataract Extraction: No


Hx Cardiac Surgery: No


Hx Lung Surgery: No


Hx Breast Surgery: No


Hx Breast Biopsy: No


Hx Abdominal Surgery: No


Hx Appendectomy: No


Hx Cholecystectomy: No


Hx Genitourinary Surgery: No


Hx  Section: No


Hx Orthopedic Surgery: No


Anesthesia Reaction: No





- PPD History


Previous Implant?: Yes


Documented Results: Negative w/o proof


Implanted On Prior R Admission?: Yes


Date: 17


Results: 0MM


PPD to be Administered?: Yes





- Reproductive History


Patient is a Female of Child Bearing Age (11 -55 yrs old): No (PATIENT IS MALE.)





- Smoking Cessation


Smoking history: Current every day smoker


Have you smoked in the past 12 months: Yes


Aproximately how many cigarettes per day: 20


Cigars Per Day: 0


Hx Chewing Tobacco Use: No


Initiated information on smoking cessation: Yes


'Breaking Loose' booklet given: 18 (GIVEN TO PATIENT.)





- Substance & Tx. History


Hx Alcohol Use: Yes


Hx Substance Use: Yes


Substance Use Type: Alcohol, Heroin


Hx Substance Use Treatment: Yes (Previous Detox/Rehab admissions at [facility] (most 

recent: 2017).)





- Substances Abused


  ** Alcohol


Route: Oral


Frequency: 3-6 times per week


Amount used: 6-12 pack beer


Age of first use: 15


Date of Last Use: 18





  ** Heroin


Route: Inhalation


Frequency: Daily


Amount used: 7-8 bags


Age of first use: 11


Date of Last Use: 17





Family Disease History





- Family Disease History


Family Disease History: Heart Disease: Father (MI,), Mother (ETOH 

DEPENDENT AND ,MI), Brother (MI; Bipolar Disorder.), Sister (MI), Other: 

Mother, Brother, Son (Down Syndrome.)





Admission Physical Exam [facility]





- Vital Signs


Vital Signs: 


 Vital Signs - 24 hr











  18





  11:50


 


Temperature 96.4 F L


 


Pulse Rate 77


 


Respiratory 20





Rate 


 


Blood Pressure 157/72














- Physical


General Appearance: Yes: No Apparent Distress, Nourished, Appropriately Dressed

, Tremorous, Anxious


HEENTM: Yes: Hearing grossly Normal, Normocephalic, Normal Voice, LASHA, Pharynx 

Normal


Respiratory: Yes: Chest Non-Tender, Lungs Clear, No Respiratory Distress, No 

Accessory Muscle Use


Neck: Yes: No masses,lesions,Nodules, Supple, Trachea in good position


Breast: Yes: Breast Exam Deferred


Cardiology: Yes: Regular Rhythm, Regular Rate, S1, S2


Abdominal: Yes: Normal Bowel Sounds, Non Tender, Flat, Soft


Genitourinary: Yes: Within Normal Limits


Back: Yes: Normal Inspection


Musculoskeletal: Yes: Gait Steady, Joint Stiffness, Muscle Pain


Extremities: Yes: Normal Capillary Refill, Normal Range of Motion, Non-Tender, 

Tremors


Neurological: Yes: Fully Oriented, Alert, Normal Mood/Affect, Normal Response


Integumentary: Yes: Normal Color, Dry, Warm


Lymphatic: Yes: Within Normal Limits





- Diagnostic


(1) Uncomplicated alcohol dependence


Current Visit: Yes   Status: Chronic   





(2) History of hypercholesterolemia


Current Visit: Yes   Status: Suspected   





(3) Nicotine dependence


Current Visit: Yes   Status: Chronic   


Qualifiers: 


   Nicotine product type: cigarettes   Substance use status: uncomplicated   

Qualified Code(s): F17.210 - Nicotine dependence, cigarettes, uncomplicated   





(4) Anxiety and depression


Current Visit: Yes   Status: Chronic   





(5) History of MI (myocardial infarction)


Current Visit: Yes   Status: Resolved   





(6) Opioid dependence with withdrawal


Current Visit: Yes   Status: Acute   





(7) Hypertension


Current Visit: Yes   Status: Chronic   


Qualifiers: 


   Hypertension type: essential hypertension   Qualified Code(s): I10 - 

Essential (primary) hypertension   





Cleared for Admission [facility]





- Detox or Rehab


[facility] Level of Care: Medically Managed


Detox Regimen/Protocol: Methadone





[facility] Breath Alcohol Content


Breath Alcohol Content: 0





Urine Drug Screen





- Results


Drug Screen Negative: No


Urine Drug Screen Results: OPI-Opiates

## 2018-01-18 LAB
ALBUMIN SERPL-MCNC: 4 G/DL (ref 3.4–5)
ALP SERPL-CCNC: 70 U/L (ref 45–117)
ALT SERPL-CCNC: 22 U/L (ref 12–78)
ANION GAP SERPL CALC-SCNC: 9 MMOL/L (ref 8–16)
AST SERPL-CCNC: 9 U/L (ref 15–37)
BILIRUB SERPL-MCNC: 0.4 MG/DL (ref 0.2–1)
BUN SERPL-MCNC: 16 MG/DL (ref 7–18)
CALCIUM SERPL-MCNC: 9.1 MG/DL (ref 8.5–10.1)
CHLORIDE SERPL-SCNC: 103 MMOL/L (ref 98–107)
CO2 SERPL-SCNC: 31 MMOL/L (ref 21–32)
CREAT SERPL-MCNC: 0.9 MG/DL (ref 0.7–1.3)
DEPRECATED RDW RBC AUTO: 13.6 % (ref 11.9–15.9)
GLUCOSE SERPL-MCNC: 146 MG/DL (ref 74–106)
HCT VFR BLD CALC: 41.5 % (ref 35.4–49)
HGB BLD-MCNC: 13.4 GM/DL (ref 11.7–16.9)
MCH RBC QN AUTO: 29.2 PG (ref 25.7–33.7)
MCHC RBC AUTO-ENTMCNC: 32.4 G/DL (ref 32–35.9)
MCV RBC: 90.3 FL (ref 80–96)
PLATELET # BLD AUTO: 257 K/MM3 (ref 134–434)
PMV BLD: 10.3 FL (ref 7.5–11.1)
POTASSIUM SERPLBLD-SCNC: 3.8 MMOL/L (ref 3.5–5.1)
PROT SERPL-MCNC: 7.4 G/DL (ref 6.4–8.2)
RBC # BLD AUTO: 4.59 M/MM3 (ref 4–5.6)
SODIUM SERPL-SCNC: 143 MMOL/L (ref 136–145)
WBC # BLD AUTO: 6.7 K/MM3 (ref 4–10)

## 2018-01-18 RX ADMIN — Medication SCH TAB: at 10:43

## 2018-01-18 RX ADMIN — NICOTINE SCH: 21 PATCH TRANSDERMAL at 10:44

## 2018-01-18 RX ADMIN — Medication SCH MG: at 22:10

## 2018-01-18 NOTE — PN
[facility] COWS





- Scale


Resting Pulse: 0= IN 80 or Below


Sweatin=Flushed/Facial Moisture


Restless Observation: 1= Difficult to Sit Still


Pupil Size: 0= Normal to Room Light


Bone or Joint Aches: 2= Severe Diffuse Aches


Runny Nose/ Eye Tearin= Nasal Congestion


GI Upset > 30mins: 0= None


Tremor Observation of Outstretched Hands: 2= Slight Tremor Visible


Yawning Observation: 1= 1-2x During Session


Anxiety or Irritability: 2=Irritable/Anxious


Goose Flesh Skin: 0=Smooth Skin


COWS Score: 11





[facility]HS Progress Note (SOAP)


Subjective: 





sweats


mild shakes


interrupted sleep


body aches


agitation


Objective: 





18 11:18


 Vital Signs











Temperature  98.8 F   18 09:21


 


Pulse Rate  79   18 09:21


 


Respiratory Rate  18   18 09:21


 


Blood Pressure  116/76   18 09:21


 


O2 Sat by Pulse Oximetry (%)      








 Laboratory Tests











  18





  20:45 06:00 06:00


 


WBC   6.7 


 


RBC   4.59 


 


Hgb   13.4 


 


Hct   41.5 


 


MCV   90.3 


 


MCH   29.2 


 


MCHC   32.4 


 


RDW   13.6 


 


Plt Count   257 


 


MPV   10.3 


 


Sodium    143


 


Potassium    3.8


 


Chloride    103


 


Carbon Dioxide    31


 


Anion Gap    9


 


BUN    16  D


 


Creatinine    0.9  D


 


Creat Clearance w eGFR    > 60


 


Random Glucose    146 H D


 


Calcium    9.1


 


Total Bilirubin    0.4


 


AST    9 L D


 


ALT    22  D


 


Alkaline Phosphatase    70


 


Total Protein    7.4  D


 


Albumin    4.0  D


 


Urine Color  Yellow  


 


Urine Appearance  Clear  


 


Urine pH  7.0  D  


 


Ur Specific Gravity  1.021  


 


Urine Protein  Negative  


 


Urine Glucose (UA)  Negative  


 


Urine Ketones  1+ H  


 


Urine Blood  Negative  


 


Urine Nitrite  Negative  


 


Urine Bilirubin  Negative  


 


Urine Urobilinogen  4.0 e.u/dl  


 


Ur Leukocyte Esterase  Negative  








aaox3


ambulating


no acute distress


Assessment: 





18 11:18


withdrawal sx


Plan: 





continue detox


increase fluids

## 2018-01-18 NOTE — EKG
Test Reason : 

Blood Pressure : ***/*** mmHG

Vent. Rate : 071 BPM     Atrial Rate : 076 BPM

   P-R Int : 134 ms          QRS Dur : 084 ms

    QT Int : 408 ms       P-R-T Axes : -01 [phone] degrees

   QTc Int : 443 ms

 

NORMAL SINUS RHYTHM

NORMAL ECG

WHEN COMPARED WITH ECG OF 14-NOV-2017 21:25,

NON-SPECIFIC CHANGE IN ST SEGMENT IN ANTERIOR LEADS

NONSPECIFIC T WAVE ABNORMALITY, WORSE IN ANTERIOR LEADS

Confirmed by LUBA AMEZCUA, OPHELIA ([id number]) on 1/18/2018 12:37:47 PM

 

Referred By:             Confirmed By:OPHELIA VERDUZCO MD

## 2018-01-18 NOTE — CONSULT
[facility] Psychiatric Consult





- Data


Date of interview: 01/18/18


Admission source: [facility]


Identifying data: This is 52 years old male with no psychiatric hospitalization 

history intoxicated wiith:  Alcohol, Cocaine and Nicotine


Substance Abuse History: - Smoking Cessation.  Smoking history: Current every 

day smoker.  Have you smoked in the past 12 months: Yes.  Aproximately how many 

cigarettes per day: 20.  Cigars Per Day: 0.  Hx Chewing Tobacco Use: No.  

Initiated information on smoking cessation: Yes.  'Breaking Loose' booklet given

: 01/17/18 (GIVEN TO PATIENT.).  - Substance & Tx. History.  Hx Alcohol Use: 

Yes.  Hx Substance Use: Yes.  Substance Use Type: Alcohol, Heroin.  Hx 

Substance Use Treatment: Yes (Previous Detox/Rehab admissions at [facility] (most 

recent: 11/2017).).  - Substances Abused.  ** Alcohol.  Route: Oral.  Frequency

: 3-6 times per week.  Amount used: 6-12 pack beer.  Age of first use: 15.  

Date of Last Use: 01/16/18.  ** Heroin.  Route: Inhalation.  Frequency: Daily.  

Amount used: 7-8 bags.  Age of first use: 11.  Date of Last Use: 01/16/18


Medical History: HyperchoLesterolemia, Hisotry of MI, HTN


Psychiatric History: Juan donovan history o9f anxiety and depression, 

reports no medications taking prior to admission


Physical/Sexual Abuse/Trauma History: Denies


Additional Comment: Observation.  Detox Unit Care Protocol





Mental Status Exam





- Mental Status Exam


Alert and Oriented to: Person


Cognitive Function: Fair


Patient Appearance: Unkempt


Mood: Sad


Affect: Flat


Patient Behavior: Cooperative


Speech Pattern: Appropriate


Voice Loudness: Mildly Soft/Quiet


Thought Process: Circumstantial


Thought Disorder: Being Controlled


Hallucinations: Denies


Suicidal Ideation: Denies


Homicidal Ideation: Denies


Insight/Judgement: Fair


Sleep: Difficulty falling asleep


Appetite: Fair


Muscle strength/Tone: Mild Hypotonicity


Gait/Station: Shuffling


Additional Comments: Observation.  Detox Unit Care Protocol





Psychiatric Findings





- Problem List (Axis 1, 2,3)


(1) Opioid dependence with withdrawal


Current Visit: Yes   Status: Acute   





(2) Anxiety and depression


Current Visit: Yes   Status: Chronic   





(3) Nicotine dependence


Current Visit: Yes   Status: Chronic   


Qualifiers: 


   Nicotine product type: cigarettes   Substance use status: uncomplicated   

Qualified Code(s): F17.210 - Nicotine dependence, cigarettes, uncomplicated   





(4) Uncomplicated alcohol dependence


Current Visit: Yes   Status: Chronic   





(5) mood disorder nos


Current Visit: No   Status: Active   





(6) Alcohol dependence


Current Visit: No   Status: Acute   





(7) Alcohol dependence with uncomplicated withdrawal


Current Visit: No   Status: Acute   





(8) Drug-induced mood disorder


Current Visit: No   Status: Acute   





(9) Opioid dependence


Current Visit: No   Status: Acute   





(10) Substance induced mood disorder


Current Visit: No   Status: Acute   





(11) Substance-induced sleep disorder


Current Visit: No   Status: Acute   





- Initial Treatment Plan


Initial Treatment Plan: Observation.  Detox Unit Care Protocol

## 2018-01-19 RX ADMIN — Medication SCH TAB: at 10:54

## 2018-01-19 RX ADMIN — NICOTINE SCH: 21 PATCH TRANSDERMAL at 10:54

## 2018-01-19 RX ADMIN — Medication SCH MG: at 22:09

## 2018-01-19 NOTE — PN
[facility] COWS





- Scale


Resting Pulse: 0= IN 80 or Below


Sweatin=Flushed/Facial Moisture


Restless Observation: 1= Difficult to Sit Still


Pupil Size: 1= Pupils >than Normal


Bone or Joint Aches: 2= Severe Diffuse Aches


Runny Nose/ Eye Tearin= Nasal Congestion


GI Upset > 30mins: 1= Stomach Cramp


Tremor Observation of Outstretched Hands: 1= Tremor Felt, Not Seen


Yawning Observation: 0= None


Anxiety or Irritability: 1=Feels Anxious/Irritable


Goose Flesh Skin: 0=Smooth Skin


COWS Score: 10





BHS Progress Note (SOAP)


Subjective: 





interrupted sleep, sweats leg cramps,pains 


Objective: 





18 12:05


 Vital Signs











Temperature  98.8 F   18 10:00


 


Pulse Rate  84   18 10:00


 


Respiratory Rate  18   18 10:00


 


Blood Pressure  126/78   18 10:00


 


O2 Sat by Pulse Oximetry (%)      








 Laboratory Tests











  18





  20:45 06:00 06:00


 


WBC   6.7 


 


RBC   4.59 


 


Hgb   13.4 


 


Hct   41.5 


 


MCV   90.3 


 


MCH   29.2 


 


MCHC   32.4 


 


RDW   13.6 


 


Plt Count   257 


 


MPV   10.3 


 


Sodium    143


 


Potassium    3.8


 


Chloride    103


 


Carbon Dioxide    31


 


Anion Gap    9


 


BUN    16  D


 


Creatinine    0.9  D


 


Creat Clearance w eGFR    > 60


 


Random Glucose    146 H D


 


Calcium    9.1


 


Total Bilirubin    0.4


 


AST    9 L D


 


ALT    22  D


 


Alkaline Phosphatase    70


 


Total Protein    7.4  D


 


Albumin    4.0  D


 


Urine Color  Yellow  


 


Urine Appearance  Clear  


 


Urine pH  7.0  D  


 


Ur Specific Gravity  1.021  


 


Urine Protein  Negative  


 


Urine Glucose (UA)  Negative  


 


Urine Ketones  1+ H  


 


Urine Blood  Negative  


 


Urine Nitrite  Negative  


 


Urine Bilirubin  Negative  


 


Urine Urobilinogen  4.0 e.u/dl  


 


Ur Leukocyte Esterase  Negative  


 


RPR Titer   














  18





  06:00


 


WBC 


 


RBC 


 


Hgb 


 


Hct 


 


MCV 


 


MCH 


 


MCHC 


 


RDW 


 


Plt Count 


 


MPV 


 


Sodium 


 


Potassium 


 


Chloride 


 


Carbon Dioxide 


 


Anion Gap 


 


BUN 


 


Creatinine 


 


Creat Clearance w eGFR 


 


Random Glucose 


 


Calcium 


 


Total Bilirubin 


 


AST 


 


ALT 


 


Alkaline Phosphatase 


 


Total Protein 


 


Albumin 


 


Urine Color 


 


Urine Appearance 


 


Urine pH 


 


Ur Specific Gravity 


 


Urine Protein 


 


Urine Glucose (UA) 


 


Urine Ketones 


 


Urine Blood 


 


Urine Nitrite 


 


Urine Bilirubin 


 


Urine Urobilinogen 


 


Ur Leukocyte Esterase 


 


RPR Titer  Nonreactive








pt aox3 in nad , lying in bed 


Assessment: 





18 12:06


withdrawal sx;s 


hyperglycemia 


leg cramps 





Plan: 





cont. detox 


increase fluids 


flexeril tid/prn


bgm daily

## 2018-01-20 RX ADMIN — Medication SCH TAB: at 10:40

## 2018-01-20 RX ADMIN — CYCLOBENZAPRINE HYDROCHLORIDE PRN MG: 5 TABLET, FILM COATED ORAL at 10:41

## 2018-01-20 RX ADMIN — Medication SCH MG: at 22:35

## 2018-01-20 RX ADMIN — NICOTINE SCH: 21 PATCH TRANSDERMAL at 10:41

## 2018-01-20 NOTE — PN
BHS Progress Note (SOAP)


Subjective: 





Sweating,interrupted sleep,restless


Objective: 





01/20/18 12:18


 Vital Signs - 8 hr











  01/20/18 01/20/18





  06:14 10:21


 


Temperature 97.9 F 98.2 F


 


Pulse Rate 69 83


 


Respiratory 18 16





Rate  


 


Blood Pressure 134/77 131/73








 Laboratory Tests











  01/17/18 01/18/18 01/18/18





  20:45 06:00 06:00


 


WBC   6.7 


 


RBC   4.59 


 


Hgb   13.4 


 


Hct   41.5 


 


MCV   90.3 


 


MCH   29.2 


 


MCHC   32.4 


 


RDW   13.6 


 


Plt Count   257 


 


MPV   10.3 


 


Sodium    143


 


Potassium    3.8


 


Chloride    103


 


Carbon Dioxide    31


 


Anion Gap    9


 


BUN    16  D


 


Creatinine    0.9  D


 


Creat Clearance w eGFR    > 60


 


Random Glucose    146 H D


 


Calcium    9.1


 


Total Bilirubin    0.4


 


AST    9 L D


 


ALT    22  D


 


Alkaline Phosphatase    70


 


Total Protein    7.4  D


 


Albumin    4.0  D


 


Urine Color  Yellow  


 


Urine Appearance  Clear  


 


Urine pH  7.0  D  


 


Ur Specific Gravity  1.021  


 


Urine Protein  Negative  


 


Urine Glucose (UA)  Negative  


 


Urine Ketones  1+ H  


 


Urine Blood  Negative  


 


Urine Nitrite  Negative  


 


Urine Bilirubin  Negative  


 


Urine Urobilinogen  4.0 e.u/dl  


 


Ur Leukocyte Esterase  Negative  


 


RPR Titer   














  01/18/18





  06:00


 


WBC 


 


RBC 


 


Hgb 


 


Hct 


 


MCV 


 


MCH 


 


MCHC 


 


RDW 


 


Plt Count 


 


MPV 


 


Sodium 


 


Potassium 


 


Chloride 


 


Carbon Dioxide 


 


Anion Gap 


 


BUN 


 


Creatinine 


 


Creat Clearance w eGFR 


 


Random Glucose 


 


Calcium 


 


Total Bilirubin 


 


AST 


 


ALT 


 


Alkaline Phosphatase 


 


Total Protein 


 


Albumin 


 


Urine Color 


 


Urine Appearance 


 


Urine pH 


 


Ur Specific Gravity 


 


Urine Protein 


 


Urine Glucose (UA) 


 


Urine Ketones 


 


Urine Blood 


 


Urine Nitrite 


 


Urine Bilirubin 


 


Urine Urobilinogen 


 


Ur Leukocyte Esterase 


 


RPR Titer  Nonreactive








labs noted


Assessment: 





01/20/18 12:18


Withdrawal sx.


Plan: 





Continue detox

## 2018-01-21 RX ADMIN — CYCLOBENZAPRINE HYDROCHLORIDE PRN MG: 5 TABLET, FILM COATED ORAL at 10:47

## 2018-01-21 RX ADMIN — NICOTINE SCH: 21 PATCH TRANSDERMAL at 10:47

## 2018-01-21 RX ADMIN — Medication SCH MG: at 22:50

## 2018-01-21 RX ADMIN — Medication SCH TAB: at 10:47

## 2018-01-21 NOTE — PN
BHS Progress Note (SOAP)


Subjective: 





general body ache


nasal congestion


sweat


restlessness


Objective: 





01/21/18 11:04


 Vital Signs











Temperature  98.8 F   01/21/18 10:22


 


Pulse Rate  77   01/21/18 10:22


 


Respiratory Rate  18   01/21/18 10:22


 


Blood Pressure  141/85   01/21/18 10:22


 


O2 Sat by Pulse Oximetry (%)      








 Laboratory Last Values











WBC  6.7 K/mm3 (4.0-10.0)   01/18/18  06:00    


 


RBC  4.59 M/mm3 (4.00-5.60)   01/18/18  06:00    


 


Hgb  13.4 GM/dL (11.7-16.9)   01/18/18  06:00    


 


Hct  41.5 % (35.4-49)   01/18/18  06:00    


 


MCV  90.3 fl (80-96)   01/18/18  06:00    


 


MCH  29.2 pg (25.7-33.7)   01/18/18  06:00    


 


MCHC  32.4 g/dl (32.0-35.9)   01/18/18  06:00    


 


RDW  13.6 % (11.9-15.9)   01/18/18  06:00    


 


Plt Count  257 K/MM3 (134-434)   01/18/18  06:00    


 


MPV  10.3 fl (7.5-11.1)   01/18/18  06:00    


 


Sodium  143 mmol/L (136-145)   01/18/18  06:00    


 


Potassium  3.8 mmol/L (3.5-5.1)   01/18/18  06:00    


 


Chloride  103 mmol/L ()   01/18/18  06:00    


 


Carbon Dioxide  31 mmol/L (21-32)   01/18/18  06:00    


 


Anion Gap  9  (8-16)   01/18/18  06:00    


 


BUN  16 mg/dL (7-18)  D 01/18/18  06:00    


 


Creatinine  0.9 mg/dL (0.7-1.3)  D 01/18/18  06:00    


 


Creat Clearance w eGFR  > 60  (>60)   01/18/18  06:00    


 


POC Glucometer  85 UNITS ()   01/21/18  07:15    


 


Random Glucose  146 mg/dL ()  H D 01/18/18  06:00    


 


Calcium  9.1 mg/dL (8.5-10.1)   01/18/18  06:00    


 


Total Bilirubin  0.4 mg/dL (0.2-1.0)   01/18/18  06:00    


 


AST  9 U/L (15-37)  L D 01/18/18  06:00    


 


ALT  22 U/L (12-78)  D 01/18/18  06:00    


 


Alkaline Phosphatase  70 U/L ()   01/18/18  06:00    


 


Total Protein  7.4 g/dl (6.4-8.2)  D 01/18/18  06:00    


 


Albumin  4.0 g/dl (3.4-5.0)  D 01/18/18  06:00    


 


Urine Color  Yellow   01/17/18  20:45    


 


Urine Appearance  Clear   01/17/18  20:45    


 


Urine pH  7.0  (5.0-8.0)  D 01/17/18  20:45    


 


Ur Specific Gravity  1.021  (1.001-1.035)   01/17/18  20:45    


 


Urine Protein  Negative  (NEGATIVE)   01/17/18  20:45    


 


Urine Glucose (UA)  Negative  (NEGATIVE)   01/17/18  20:45    


 


Urine Ketones  1+  (NEGATIVE)  H  01/17/18  20:45    


 


Urine Blood  Negative  (NEGATIVE)   01/17/18  20:45    


 


Urine Nitrite  Negative  (NEGATIVE)   01/17/18  20:45    


 


Urine Bilirubin  Negative  (NEGATIVE)   01/17/18  20:45    


 


Urine Urobilinogen  4.0 e.u/dl mg/dL (0.2-1.0)   01/17/18  20:45    


 


Ur Leukocyte Esterase  Negative  (NEGATIVE)   01/17/18  20:45    


 


RPR Titer  Nonreactive  (NONREACTIVE)   01/18/18  06:00    








lab noted


Assessment: 





01/21/18 11:05


withdrawal sx


Plan: 





continue detox

## 2018-01-22 VITALS — DIASTOLIC BLOOD PRESSURE: 78 MMHG | HEART RATE: 93 BPM | SYSTOLIC BLOOD PRESSURE: 123 MMHG | TEMPERATURE: 100.6 F

## 2018-01-22 RX ADMIN — NICOTINE SCH: 21 PATCH TRANSDERMAL at 10:54

## 2018-01-22 RX ADMIN — Medication SCH TAB: at 10:54

## 2018-01-22 NOTE — DS
[facility] Detox Discharge Summary


Admission Date: 


01/17/18





Discharge Date: 01/22/18





- History


Present History: Opioid Dependence


Pertinent Past History: 


see below





- Physical Exam Results


Vital Signs: 


 Vital Signs











Temperature  98.4 F   01/22/18 06:23


 


Pulse Rate  67   01/22/18 06:23


 


Respiratory Rate  16   01/22/18 06:23


 


Blood Pressure  138/79   01/22/18 06:23


 


O2 Sat by Pulse Oximetry (%)      











Pertinent Admission Physical Exam Findings: 


admitted in acute withdrawal


medically stable on dc


detox completed


dc today





- Treatment


Hospital Course: Detox Protocol Followed, Detoxed Safely, Responded well, 

Discharged Condition Good, Rehab Referral Accepted





- Medication


Discharge Medications: 


Ambulatory Orders





NK [No Known Home Medication]  01/17/18 











- AMA


Did Patient Leave Against Medical Advice: No

## 2018-04-27 ENCOUNTER — HOSPITAL ENCOUNTER (INPATIENT)
Dept: HOSPITAL 74 - YASAS | Age: 53
LOS: 6 days | Discharge: HOME | DRG: 773 | End: 2018-05-03
Attending: INTERNAL MEDICINE | Admitting: INTERNAL MEDICINE
Payer: COMMERCIAL

## 2018-04-27 VITALS — BODY MASS INDEX: 23.7 KG/M2

## 2018-04-27 DIAGNOSIS — Z59.0: ICD-10-CM

## 2018-04-27 DIAGNOSIS — E78.00: ICD-10-CM

## 2018-04-27 DIAGNOSIS — I25.2: ICD-10-CM

## 2018-04-27 DIAGNOSIS — F10.230: ICD-10-CM

## 2018-04-27 DIAGNOSIS — F19.24: ICD-10-CM

## 2018-04-27 DIAGNOSIS — F11.23: Primary | ICD-10-CM

## 2018-04-27 DIAGNOSIS — F17.213: ICD-10-CM

## 2018-04-27 DIAGNOSIS — G47.00: ICD-10-CM

## 2018-04-27 DIAGNOSIS — F32.9: ICD-10-CM

## 2018-04-27 DIAGNOSIS — I10: ICD-10-CM

## 2018-04-27 PROCEDURE — HZ2ZZZZ DETOXIFICATION SERVICES FOR SUBSTANCE ABUSE TREATMENT: ICD-10-PCS | Performed by: INTERNAL MEDICINE

## 2018-04-27 SDOH — ECONOMIC STABILITY - HOUSING INSECURITY: HOMELESSNESS: Z59.0

## 2018-04-27 NOTE — HP
COWS





- Scale


Resting Pulse: 0= TN 80 or Below


Sweatin= Chills/Flushing


Restless Observation: 3= Extraneous Movement


Pupil Size: 2= Moderately Dilated


Bone or Joint Aches: 2= Severe Diffuse Aches (Aches in back and legs)


Runny Nose/ Eye Tearin= None


GI Upset > 30mins: 1= Stomach Cramp


Tremor Observation: 2= Slight Tremor Visible


Yawning Observation: 0= None


Anxiety or Irritability: 1=Feels Anxious/Irritable


Goose Flesh Skin: 0=Smooth Skin


COWS Score: 12





CIWA Score





- CIWA Score


Nausea/Vomitin-Mild Nausea/No Vomiting


Muscle Tremors: 4-Moderate,w/Arms Extend


Anxiety: 1-Mildly Anxious


Agitation: 4-Moderately Restless


Paroxysmal Sweats: No Perspiration


Orientation: 0-Oriented


Tacttile Disturbances: 0-None


Auditory Disturbances: 0-None


Visual Disturbances: 0-None


Headache: 1-Very Mild (1-2 black outs. Denies seizures.)


CIWA-Ar Total Score: 11





Admission [facility]





- [facility]


Chief Complaint: 





Here for alcohol and opiate withdrawal.


Allergies/Adverse Reactions: 


 Allergies











Allergy/AdvReac Type Severity Reaction Status Date / Time


 


shellfish derived Allergy Severe Swelling Verified 17 15:44


 


tomato Allergy Severe Swelling Verified 18 12:22


 


Fish Containing Products Allergy Mild Rash Verified 17 18:57


 


No Known Drug Allergies Allergy   Verified 17 18:57


 


cheese AdvReac Intermediate Swelling Verified 18 15:04











History of Present Illness: 





53 yom with 40 year hx heroin and alcohol use. Has a hx intermittent HTN but 

not on medications. Hx. depression and anxious r/t daughters death since 2016. 

No meds for depresion 


Exam Limitations: No Limitations





- Ebola screening


Have you traveled outside of the country in the last 21 days: No


Have you had contact with anyone from an Ebola affected area: No


Have you been sick,other than usual withdrawal symptoms: No


Do you have a fever: No





- Review of Systems


Constitutional: Chills, Loss of Appetite, Unexplained wgt Loss (6 lb weight 

loss over past 2 weeks.)


EENT: reports: Blurred Vision (Needs glasses. Last eye exam over 10-15 years.), 

Dental Problems (Cracked amd missing teetch w/ cavities. Denies tooth pain.)


Respiratory: reports: No Symptoms reported


Cardiac: reports: Other (Intermittent high blood pressure. Had a heart attack 

10 years ago. Denies chest pain or SOB.)


GI: reports: Constipated (Occ abd pain when attempting push out stool when 

constipated. BM's every 1-3 days.), Nausea, Poor Appetite, Abdominal cramping (

Because of withdrawal)


: reports: No Symptoms Reported


Musculoskeletal: reports: No Symptoms Reported


Integumentary: reports: Other (Patches below both eyes, for 5 years,  which are 

related to elevated cholesterol.)


Neuro: reports: Numbness (Intermitten numbness of tips of fingers with a 

buzzing sensation.)


Endocrine: reports: No Symptoms Reported


Hematology: reports: No Symptoms Reported


Psychiatric: reports: Orientated x3 (Denies thought of suicide or violent 

ideation), Depressed





Patient History





- Patient Medical History


Hx Anemia: No


Hx Asthma: No


Hx Chronic Obstructive Pulmonary Disease (COPD): No


Hx Cancer: No


Hx Cardiac Disorders: Yes (MI approx. 10 years ago.)


Hx Congestive Heart Failure: No


Hx Hypertension: Yes (No outside prescribed medication.)


Hx Hypercholesterolemia: Yes (LIPITOR 20MG LAST TAKEN 6 MONTHS AGO)


Hx Pacemaker: No


HX Cerebrovascular Accident: No


Hx Seizures: No


Hx Dementia: No


Hx Diabetes: No


Hx Gastrointestinal Disorders: No


Hx Liver Disease: No


Hx Genitourinary Disorders: No


Hx Sexually Transmitted Disorders: No


Hx Renal Disease (ESRD): No


Hx Thyroid Disease: No


Hx Human Immunodeficiency Virus (HIV): No (Last Tested: : NEGATIVE)


Hx Hepatitis C: No (Last Tested approx. 3 months ago: NEGATIVE)


Hx Depression: Yes (Medication in past, not currently.)


Hx Suicide Attempt: No (PATIENT DENIES CURRENT SI / HI.)


Hx Bipolar Disorder: No


Hx Schizophrenia: No





- Patient Surgical History


Past Surgical History: No


Hx Neurologic Surgery: No


Hx Cataract Extraction: No


Hx Cardiac Surgery: No


Hx Lung Surgery: No


Hx Breast Surgery: No


Hx Breast Biopsy: No


Hx Abdominal Surgery: No


Hx Appendectomy: No


Hx Cholecystectomy: No


Hx Genitourinary Surgery: No


Hx  Section: No


Hx Orthopedic Surgery: No


Anesthesia Reaction: No





- PPD History


Documented Results: Negative w/o proof


Date: 18


Results: 0MM


PPD to be Administered?: Yes





- Smoking Cessation


Smoking history: Current every day smoker


Have you smoked in the past 12 months: Yes


Aproximately how many cigarettes per day: 20


Cigars Per Day: 0


Hx Chewing Tobacco Use: No


Initiated information on smoking cessation: Yes


'Breaking Loose' booklet given: 18





- Substance & Tx. History


Hx Alcohol Use: Yes


Hx Substance Use: Yes


Substance Use Type: Alcohol, Opiates


Hx Substance Use Treatment: Yes (Multiple detox's; No hx methadone tx programs)





- Substances Abused


  ** Alcohol


Route: Oral


Frequency: 3-6 times per week


Amount used: 6-12 cans beer


Age of first use: 13


Date of Last Use: 18





  ** Heroin


Route: Inhalation


Frequency: Daily


Amount used: 7 - 10 bags


Age of first use: 13


Date of Last Use: 18





Family Disease History





- Family Disease History


Family Disease History: Heart Disease: Father (MI,), Mother (ETOH 

DEPENDENT AND ,MI), Brother (MI; Bipolar Disorder.), Sister (MI), Other: 

Mother, Brother, Son (Down Syndrome.)





Admission Physical Exam [facility]





- Vital Signs


Vital Signs: 


 Vital Signs - 24 hr











  18





  21:36


 


Temperature 97.6 F


 


Pulse Rate 68


 


Respiratory 21





Rate 


 


Blood Pressure 155/94














- Physical


General Appearance: Yes: Mild Distress, Tremorous, Anxious


HEENTM: Yes: Hearing grossly Normal, Normal Voice, LASHA, Rhinorrhea, Other (

Perforated nasal septum)


Respiratory: Yes: Lungs Clear, Normal Breath Sounds, No Respiratory Distress


Neck: Yes: No masses,lesions,Nodules, Supple


Breast: Yes: Breast Exam Deferred


Cardiology: Yes: Regular Rate, S1, S2, Other (Elevated blood pressure.)


Abdominal: Yes: Normal Bowel Sounds, Non Tender, Soft


Genitourinary: Yes: Within Normal Limits


Back: Yes: Normal Inspection


Musculoskeletal: Yes: full range of Motion, Gait Steady


Extremities: Yes: Normal Capillary Refill, Normal Inspection, Normal Range of 

Motion


Neurological: Yes: Fully Oriented, Alert


Integumentary: Yes: Other (# Lipoid lesions  approx 5, 8, and 10 mms located 

below both eyes. Non tender.)





- Diagnostic


(1) Alcohol dependence with uncomplicated withdrawal


Current Visit: No   Status: Acute   





(2) Insomnia


Current Visit: Yes   Status: Acute   


Qualifiers: 


   Insomnia type: unspecified   Qualified Code(s): G47.00 - Insomnia, 

unspecified   





(3) Opioid dependence with withdrawal


Current Visit: Yes   Status: Acute   





(4) Depression


Current Visit: Yes   Status: Chronic   





(5) Hypertension


Current Visit: Yes   Status: Acute   


Qualifiers: 


   Hypertension type: essential hypertension   Qualified Code(s): I10 - 

Essential (primary) hypertension   





(6) Nicotine dependence


Current Visit: Yes   Status: Chronic   


Qualifiers: 


   Nicotine product type: cigarettes   Substance use status: uncomplicated   

Qualified Code(s): F17.210 - Nicotine dependence, cigarettes, uncomplicated   





Cleared for Admission [facility]





- Detox or Rehab


[facility] Level of Care: Medically Managed





[facility] Breath Alcohol Content


Breath Alcohol Content: 0





Urine Drug Screen





- Results


Drug Screen Negative: No


Urine Drug Screen Results: OPI-Opiates

## 2018-04-28 LAB
ALBUMIN SERPL-MCNC: 3.9 G/DL (ref 3.4–5)
ALP SERPL-CCNC: 75 U/L (ref 45–117)
ALT SERPL-CCNC: 20 U/L (ref 12–78)
ANION GAP SERPL CALC-SCNC: 8 MMOL/L (ref 8–16)
APPEARANCE UR: (no result)
AST SERPL-CCNC: 12 U/L (ref 15–37)
BILIRUB SERPL-MCNC: 0.6 MG/DL (ref 0.2–1)
BILIRUB UR STRIP.AUTO-MCNC: NEGATIVE MG/DL
BUN SERPL-MCNC: 10 MG/DL (ref 7–18)
CALCIUM SERPL-MCNC: 8.8 MG/DL (ref 8.5–10.1)
CHLORIDE SERPL-SCNC: 104 MMOL/L (ref 98–107)
CO2 SERPL-SCNC: 32 MMOL/L (ref 21–32)
COLOR UR: YELLOW
CREAT SERPL-MCNC: 0.9 MG/DL (ref 0.7–1.3)
DEPRECATED RDW RBC AUTO: 13.8 % (ref 11.9–15.9)
GLUCOSE SERPL-MCNC: 76 MG/DL (ref 74–106)
HCT VFR BLD CALC: 41.3 % (ref 35.4–49)
HGB BLD-MCNC: 14 GM/DL (ref 11.7–16.9)
KETONES UR QL STRIP: NEGATIVE
LEUKOCYTE ESTERASE UR QL STRIP.AUTO: NEGATIVE
MCH RBC QN AUTO: 30.4 PG (ref 25.7–33.7)
MCHC RBC AUTO-ENTMCNC: 33.9 G/DL (ref 32–35.9)
MCV RBC: 89.5 FL (ref 80–96)
NITRITE UR QL STRIP: NEGATIVE
PH UR: 6 [PH] (ref 5–8)
PLATELET # BLD AUTO: 194 K/MM3 (ref 134–434)
PMV BLD: 10.3 FL (ref 7.5–11.1)
POTASSIUM SERPLBLD-SCNC: 3.3 MMOL/L (ref 3.5–5.1)
PROT SERPL-MCNC: 7.5 G/DL (ref 6.4–8.2)
PROT UR QL STRIP: NEGATIVE
PROT UR QL STRIP: NEGATIVE
RBC # BLD AUTO: 4.62 M/MM3 (ref 4–5.6)
RBC # UR STRIP: NEGATIVE /UL
SODIUM SERPL-SCNC: 144 MMOL/L (ref 136–145)
SP GR UR: 1.02 (ref 1–1.03)
UROBILINOGEN UR STRIP-MCNC: 2 MG/DL (ref 0.2–1)
WBC # BLD AUTO: 6.5 K/MM3 (ref 4–10)

## 2018-04-28 RX ADMIN — ZOLPIDEM TARTRATE PRN MG: 10 TABLET, FILM COATED ORAL at 22:20

## 2018-04-28 RX ADMIN — POTASSIUM CHLORIDE SCH MEQ: 1500 TABLET, EXTENDED RELEASE ORAL at 17:23

## 2018-04-28 RX ADMIN — Medication SCH TAB: at 10:14

## 2018-04-28 RX ADMIN — NICOTINE SCH: 21 PATCH TRANSDERMAL at 10:15

## 2018-04-28 RX ADMIN — Medication SCH MG: at 22:20

## 2018-04-28 RX ADMIN — LISINOPRIL SCH MG: 5 TABLET ORAL at 10:14

## 2018-04-28 NOTE — PN
S CIWA





- CIWA Score


Nausea/Vomiting: 3


Muscle Tremors: 3


Anxiety: 3


Agitation: 4-Moderately Restless


Paroxysmal Sweats: No Perspiration


Orientation: 0-Oriented


Tacttile Disturbances: 2-Mild Itch/Numbness/Burn


Auditory Disturbances: 2-Mild Harshness/Frighten


Visual Disturbances: 0-None


Headache: 0-None Present


CIWA-Ar Total Score: 17





[facility] Progress Note (SOAP)


Subjective: 





Body Aches, Anxious, Fatigue, Tremors, Diarrhea, Vomiting.


Objective: 


PATIENT A & O X 3, OBSERVED AMBULATING ON UNIT. NO ACUTE DISTRESS.





04/28/18 13:37


 Vital Signs











Temperature  97.8 F   04/28/18 09:46


 


Pulse Rate  74   04/28/18 09:46


 


Respiratory Rate  18   04/28/18 09:46


 


Blood Pressure  115/80   04/28/18 09:46


 


O2 Sat by Pulse Oximetry (%)      








ADMISSION LABS RESULTS PENDING.


Assessment: 





04/28/18 13:38


WITHDRAWAL SYMPTOMS.


Plan: 





CONTINUE DETOX.

## 2018-04-28 NOTE — PN
[facility] Progress Note


Note: 





Results of Admission CBC and CMP noted. Patient noted to have Hypokalemia. K-Dur

, 20 MEQ PO BID ordered. Results of admission RPR and HIV AB still pending. LUCY Martin NP.

## 2018-04-28 NOTE — CONSULT
BHS Psychiatric Consult





- Data


Date of interview: 04/28/18


Admission source: [facility]


Identifying data: One of multiple admissions to [facility] for this 52 y/o 

 male seeking detox treatment on [address] for alcohol and heroin 

dependence.Patient is single,a father of four (claimed six at a previous 

encounter),homeless,unemployed and supported on food stamps.


Substance Abuse History: Confirmed by patient in this interview.Details in 

current BHS report : Smoking history: Current every day smoker.  Have you 

smoked in the past 12 months: Yes.  Aproximately how many cigarettes per day: 

20.  Cigars Per Day: 0.  Hx Chewing Tobacco Use: No.  Initiated information on 

smoking cessation: Yes.  'Breaking Loose' booklet given: 04/27/18.  - Substance 

& Tx. History.  Hx Alcohol Use: Yes.  Hx Substance Use: Yes.  Substance Use Type

: Alcohol, Opiates.  Hx Substance Use Treatment: Yes (Multiple detox's; No hx 

methadone tx programs).  - Substances Abused.  ** Alcohol.  Route: Oral.  

Frequency: 3-6 times per week.  Amount used: 6-12 cans beer.  Age of first use: 

13.  Date of Last Use: 04/25/18.  ** Heroin.  Route: Inhalation.  Frequency: 

Daily.  Amount used: 7 - 10 bags.  Age of first use: 13.  Date of Last Use: 04/ 25/18


Medical History: Past history of myocardial infarction,hypertension and 

dyslipidemia.


Psychiatric History: History of multiple psychiatric hospitalizations ([facility]).Onset of psychiatric disturbances : 

age 18. Diagnosed with MDD.Mr Garcia reports psychiatric follow up for eight 

consecutive years at an OPD program, [facility] in [facility], until 

he dropped out of treatment five months ago (self-report).NOT on psychotropic 

medications.Patient denies history of suicide attempts in this interview.


Physical/Sexual Abuse/Trauma History: Patient denies.


Additional Comment: Urine Drug Screen Results: OPI-Opiates.Noted.





Mental Status Exam





- Mental Status Exam


Alert and Oriented to: Time, Place, Person


Cognitive Function: Good


Patient Appearance: Well Groomed


Mood: Nervous, Withdrawn


Affect: Mood Congruent


Patient Behavior: Fatigued, Cooperative


Speech Pattern: Clear


Voice Loudness: Normal


Thought Process: Intact, Goal Oriented


Thought Disorder: Not Present


Hallucinations: Denies


Suicidal Ideation: Denies


Homicidal Ideation: Denies


Insight/Judgement: Poor


Sleep: Poorly, Difficulty falling asleep


Appetite: Good


Muscle strength/Tone: Normal


Gait/Station: Normal





Psychiatric Findings





- Problem List (Axis 1, 2,3)


(1) Alcohol dependence with uncomplicated withdrawal


Current Visit: Yes   Status: Acute   





(2) Opioid dependence with withdrawal


Current Visit: Yes   Status: Acute   





(3) Nicotine dependence


Current Visit: Yes   Status: Acute   


Qualifiers: 


   Nicotine product type: cigarettes   Substance use status: uncomplicated   

Qualified Code(s): F17.210 - Nicotine dependence, cigarettes, uncomplicated   





(4) Substance induced mood disorder


Current Visit: Yes   Status: Acute   





(5) Insomnia


Current Visit: Yes   Status: Acute   





- Initial Treatment Plan


Initial Treatment Plan: Psychoeducation.Sleep hygiene.Detoxification.Ambien 10 

mg po hs prn.Paatient is informed of potential for parasomnias.He agrees with 

this careplan.Observation.

## 2018-04-28 NOTE — EKG
Test Reason : 

Blood Pressure : ***/*** mmHG

Vent. Rate : 065 BPM     Atrial Rate : 065 BPM

   P-R Int : 134 ms          QRS Dur : 084 ms

    QT Int : 408 ms       P-R-T Axes : 006 083 051 degrees

   QTc Int : 424 ms

 

NORMAL SINUS RHYTHM

NORMAL ECG

WHEN COMPARED WITH ECG OF 17-JAN-2018 17:04,

NO SIGNIFICANT CHANGE WAS FOUND

Confirmed by OPHELIA VERDUZCO MD ([id number]) on 4/28/2018 11:35:09 AM

 

Referred By:             Confirmed By:OPHELIA VERDUZCO MD

## 2018-04-29 RX ADMIN — POTASSIUM CHLORIDE SCH MEQ: 1500 TABLET, EXTENDED RELEASE ORAL at 10:10

## 2018-04-29 RX ADMIN — Medication SCH MG: at 22:15

## 2018-04-29 RX ADMIN — LISINOPRIL SCH MG: 5 TABLET ORAL at 10:10

## 2018-04-29 RX ADMIN — NICOTINE SCH: 21 PATCH TRANSDERMAL at 10:10

## 2018-04-29 RX ADMIN — POTASSIUM CHLORIDE SCH MEQ: 1500 TABLET, EXTENDED RELEASE ORAL at 17:52

## 2018-04-29 RX ADMIN — Medication SCH TAB: at 10:10

## 2018-04-29 NOTE — PN
S CIWA





- CIWA Score


Nausea/Vomitin-No Nausea/No Vomiting


Muscle Tremors: 2


Anxiety: 4-Mod. Anxious/Guarded


Agitation: 3


Paroxysmal Sweats: 2


Orientation: 0-Oriented


Tacttile Disturbances: 2-Mild Itch/Numbness/Burn


Auditory Disturbances: 0-None


Visual Disturbances: 2-Mild Sensitivity


Headache: 0-None Present


CIWA-Ar Total Score: 15





BHS COWS





- Scale


Resting Pulse: 0= ND 80 or Below


Sweatin= Chills/Flushing


Restless Observation: 1= Difficult to Sit Still


Pupil Size: 0= Normal to Room Light


Bone or Joint Aches: 1= Mild Discomfort


Runny Nose/ Eye Tearin= Runny Nose/Eyes


GI Upset > 30mins: 0= None


Tremor Observation of Outstretched Hands: 2= Slight Tremor Visible


Yawning Observation: 2= >3x During Session


Anxiety or Irritability: 2=Irritable/Anxious


Goose Flesh Skin: 0=Smooth Skin


COWS Score: 11





BHS Progress Note (SOAP)


Subjective: 





Sweating, Tremors, Body Aches, Anxious.


Objective: 


PATIENT A & O X 3, OBSERVED AMBULATING ON UNIT. NO ACUTE DISTRESS.





18 14:33


 Vital Signs











Temperature  96.0 F L  18 14:20


 


Pulse Rate  74   18 14:20


 


Respiratory Rate  18   18 14:20


 


Blood Pressure  110/76   18 14:20


 


O2 Sat by Pulse Oximetry (%)      








 Laboratory Tests











  18





  09:20 09:20 09:20


 


WBC  6.5  


 


RBC  4.62  


 


Hgb  14.0  


 


Hct  41.3  


 


MCV  89.5  


 


MCH  30.4  


 


MCHC  33.9  


 


RDW  13.8  


 


Plt Count  194  D  


 


MPV  10.3  


 


Sodium   144 


 


Potassium   3.3 L 


 


Chloride   104 


 


Carbon Dioxide   32 


 


Anion Gap   8 


 


BUN   10  D 


 


Creatinine   0.9 


 


Creat Clearance w eGFR   > 60 


 


Random Glucose   76  D 


 


Calcium   8.8 


 


Total Bilirubin   0.6  D 


 


AST   12 L D 


 


ALT   20 


 


Alkaline Phosphatase   75 


 


Total Protein   7.5 


 


Albumin   3.9 


 


Urine Color   


 


Urine Appearance   


 


Urine pH   


 


Ur Specific Gravity   


 


Urine Protein   


 


Urine Glucose (UA)   


 


Urine Ketones   


 


Urine Blood   


 


Urine Nitrite   


 


Urine Bilirubin   


 


Urine Urobilinogen   


 


Ur Leukocyte Esterase   


 


RPR Titer    Nonreactive


 


HIV 1&2 Antibody Screen   


 


HIV P24 Antigen   














  18





  09:20 09:20


 


WBC  


 


RBC  


 


Hgb  


 


Hct  


 


MCV  


 


MCH  


 


MCHC  


 


RDW  


 


Plt Count  


 


MPV  


 


Sodium  


 


Potassium  


 


Chloride  


 


Carbon Dioxide  


 


Anion Gap  


 


BUN  


 


Creatinine  


 


Creat Clearance w eGFR  


 


Random Glucose  


 


Calcium  


 


Total Bilirubin  


 


AST  


 


ALT  


 


Alkaline Phosphatase  


 


Total Protein  


 


Albumin  


 


Urine Color   Yellow


 


Urine Appearance   Slcloudy


 


Urine pH   6.0


 


Ur Specific Gravity   1.018


 


Urine Protein   Negative


 


Urine Glucose (UA)   Negative


 


Urine Ketones   Negative


 


Urine Blood   Negative


 


Urine Nitrite   Negative


 


Urine Bilirubin   Negative


 


Urine Urobilinogen   2.0


 


Ur Leukocyte Esterase   Negative


 


RPR Titer  


 


HIV 1&2 Antibody Screen  Negative 


 


HIV P24 Antigen  Negative 








LABS NOTED.


Assessment: 





18 14:33


WITHDRAWAL SYMPTOMS.


HYPOKALEMIA.





18 14:34





Plan: 





CONTINUE DETOX.


CONTINUE K-DUR,


INCREASE DAILY PO FLUID INTAKE.

## 2018-04-30 RX ADMIN — LISINOPRIL SCH MG: 5 TABLET ORAL at 10:27

## 2018-04-30 RX ADMIN — ZOLPIDEM TARTRATE PRN MG: 10 TABLET, FILM COATED ORAL at 22:09

## 2018-04-30 RX ADMIN — NICOTINE SCH: 21 PATCH TRANSDERMAL at 10:28

## 2018-04-30 RX ADMIN — METHADONE HYDROCHLORIDE SCH MG: 5 TABLET ORAL at 10:27

## 2018-04-30 RX ADMIN — POTASSIUM CHLORIDE SCH MEQ: 1500 TABLET, EXTENDED RELEASE ORAL at 10:28

## 2018-04-30 RX ADMIN — POTASSIUM CHLORIDE SCH MEQ: 1500 TABLET, EXTENDED RELEASE ORAL at 17:37

## 2018-04-30 RX ADMIN — Medication SCH TAB: at 10:27

## 2018-04-30 RX ADMIN — Medication SCH MG: at 22:09

## 2018-04-30 NOTE — PN
BHS Progress Note (SOAP)


Subjective: 





Sleep disturbance


Shakes


sweats





Objective: 





04/30/18 12:26


A & O x 3


 Vital Signs











Temperature  96.2 F L  04/30/18 09:09


 


Pulse Rate  74   04/30/18 09:09


 


Respiratory Rate  20   04/30/18 09:09


 


Blood Pressure  115/70   04/30/18 09:09


 


O2 Sat by Pulse Oximetry (%)      











Assessment: 





04/30/18 12:27


withdrawal sx


Plan: 





continue detox


and all meds

## 2018-05-01 RX ADMIN — ZOLPIDEM TARTRATE PRN MG: 10 TABLET, FILM COATED ORAL at 22:06

## 2018-05-01 RX ADMIN — POTASSIUM CHLORIDE SCH MEQ: 1500 TABLET, EXTENDED RELEASE ORAL at 17:28

## 2018-05-01 RX ADMIN — Medication SCH TAB: at 10:23

## 2018-05-01 RX ADMIN — NICOTINE SCH: 21 PATCH TRANSDERMAL at 11:12

## 2018-05-01 RX ADMIN — Medication SCH MG: at 22:06

## 2018-05-01 RX ADMIN — POTASSIUM CHLORIDE SCH MEQ: 1500 TABLET, EXTENDED RELEASE ORAL at 10:23

## 2018-05-01 RX ADMIN — METHADONE HYDROCHLORIDE SCH MG: 5 TABLET ORAL at 10:25

## 2018-05-01 RX ADMIN — LISINOPRIL SCH MG: 5 TABLET ORAL at 10:23

## 2018-05-01 NOTE — PN
BHS Progress Note (SOAP)


Subjective: 





Body Aches, Fatigue.


Objective: 


PATIENT A & O X 3, OBSERVED AMBULATING ON UNIT. NO ACUTE DISTRESS.





05/01/18 13:30


 Vital Signs











Temperature  97.3 F L  05/01/18 13:21


 


Pulse Rate  69   05/01/18 13:21


 


Respiratory Rate  18   05/01/18 13:21


 


Blood Pressure  137/93   05/01/18 13:21


 


O2 Sat by Pulse Oximetry (%)      








 Laboratory Tests











  04/28/18 04/28/18 04/28/18





  09:20 09:20 09:20


 


WBC  6.5  


 


RBC  4.62  


 


Hgb  14.0  


 


Hct  41.3  


 


MCV  89.5  


 


MCH  30.4  


 


MCHC  33.9  


 


RDW  13.8  


 


Plt Count  194  D  


 


MPV  10.3  


 


Sodium   144 


 


Potassium   3.3 L 


 


Chloride   104 


 


Carbon Dioxide   32 


 


Anion Gap   8 


 


BUN   10  D 


 


Creatinine   0.9 


 


Creat Clearance w eGFR   > 60 


 


Random Glucose   76  D 


 


Calcium   8.8 


 


Total Bilirubin   0.6  D 


 


AST   12 L D 


 


ALT   20 


 


Alkaline Phosphatase   75 


 


Total Protein   7.5 


 


Albumin   3.9 


 


Urine Color   


 


Urine Appearance   


 


Urine pH   


 


Ur Specific Gravity   


 


Urine Protein   


 


Urine Glucose (UA)   


 


Urine Ketones   


 


Urine Blood   


 


Urine Nitrite   


 


Urine Bilirubin   


 


Urine Urobilinogen   


 


Ur Leukocyte Esterase   


 


RPR Titer    Nonreactive


 


HIV 1&2 Antibody Screen   


 


HIV P24 Antigen   














  04/28/18 04/28/18





  09:20 09:20


 


WBC  


 


RBC  


 


Hgb  


 


Hct  


 


MCV  


 


MCH  


 


MCHC  


 


RDW  


 


Plt Count  


 


MPV  


 


Sodium  


 


Potassium  


 


Chloride  


 


Carbon Dioxide  


 


Anion Gap  


 


BUN  


 


Creatinine  


 


Creat Clearance w eGFR  


 


Random Glucose  


 


Calcium  


 


Total Bilirubin  


 


AST  


 


ALT  


 


Alkaline Phosphatase  


 


Total Protein  


 


Albumin  


 


Urine Color   Yellow


 


Urine Appearance   Slcloudy


 


Urine pH   6.0


 


Ur Specific Gravity   1.018


 


Urine Protein   Negative


 


Urine Glucose (UA)   Negative


 


Urine Ketones   Negative


 


Urine Blood   Negative


 


Urine Nitrite   Negative


 


Urine Bilirubin   Negative


 


Urine Urobilinogen   2.0


 


Ur Leukocyte Esterase   Negative


 


RPR Titer  


 


HIV 1&2 Antibody Screen  Negative 


 


HIV P24 Antigen  Negative 








LABS NOTED.


Assessment: 





05/01/18 13:30


WITHDRAWAL SYMPTOMS.


HYPOKALEMIA.





05/01/18 13:30





Plan: 





CONTINUE DETOX.


CONTINUE K-DUR.

## 2018-05-02 RX ADMIN — ZOLPIDEM TARTRATE PRN MG: 10 TABLET, FILM COATED ORAL at 22:04

## 2018-05-02 RX ADMIN — LISINOPRIL SCH MG: 5 TABLET ORAL at 10:12

## 2018-05-02 RX ADMIN — NICOTINE SCH: 21 PATCH TRANSDERMAL at 10:12

## 2018-05-02 RX ADMIN — POTASSIUM CHLORIDE SCH MEQ: 1500 TABLET, EXTENDED RELEASE ORAL at 17:32

## 2018-05-02 RX ADMIN — Medication SCH TAB: at 10:12

## 2018-05-02 RX ADMIN — POTASSIUM CHLORIDE SCH MEQ: 1500 TABLET, EXTENDED RELEASE ORAL at 10:12

## 2018-05-02 RX ADMIN — Medication SCH MG: at 22:04

## 2018-05-02 NOTE — PN
BHS Progress Note (SOAP)


Subjective: 





Sweating, Anxious.


Objective: 


PATIENT A & O X 3, OBSERVED AMBULATING ON UNIT. NO ACUTE DISTRESS.





05/02/18 11:54


 Vital Signs











Temperature  97.6 F   05/02/18 09:13


 


Pulse Rate  78   05/02/18 09:13


 


Respiratory Rate  18   05/02/18 09:13


 


Blood Pressure  125/85   05/02/18 09:13


 


O2 Sat by Pulse Oximetry (%)      








 Laboratory Tests











  04/28/18 04/28/18 04/28/18





  09:20 09:20 09:20


 


WBC  6.5  


 


RBC  4.62  


 


Hgb  14.0  


 


Hct  41.3  


 


MCV  89.5  


 


MCH  30.4  


 


MCHC  33.9  


 


RDW  13.8  


 


Plt Count  194  D  


 


MPV  10.3  


 


Sodium   144 


 


Potassium   3.3 L 


 


Chloride   104 


 


Carbon Dioxide   32 


 


Anion Gap   8 


 


BUN   10  D 


 


Creatinine   0.9 


 


Creat Clearance w eGFR   > 60 


 


Random Glucose   76  D 


 


Calcium   8.8 


 


Total Bilirubin   0.6  D 


 


AST   12 L D 


 


ALT   20 


 


Alkaline Phosphatase   75 


 


Total Protein   7.5 


 


Albumin   3.9 


 


Urine Color   


 


Urine Appearance   


 


Urine pH   


 


Ur Specific Gravity   


 


Urine Protein   


 


Urine Glucose (UA)   


 


Urine Ketones   


 


Urine Blood   


 


Urine Nitrite   


 


Urine Bilirubin   


 


Urine Urobilinogen   


 


Ur Leukocyte Esterase   


 


RPR Titer    Nonreactive


 


HIV 1&2 Antibody Screen   


 


HIV P24 Antigen   














  04/28/18 04/28/18





  09:20 09:20


 


WBC  


 


RBC  


 


Hgb  


 


Hct  


 


MCV  


 


MCH  


 


MCHC  


 


RDW  


 


Plt Count  


 


MPV  


 


Sodium  


 


Potassium  


 


Chloride  


 


Carbon Dioxide  


 


Anion Gap  


 


BUN  


 


Creatinine  


 


Creat Clearance w eGFR  


 


Random Glucose  


 


Calcium  


 


Total Bilirubin  


 


AST  


 


ALT  


 


Alkaline Phosphatase  


 


Total Protein  


 


Albumin  


 


Urine Color   Yellow


 


Urine Appearance   Slcloudy


 


Urine pH   6.0


 


Ur Specific Gravity   1.018


 


Urine Protein   Negative


 


Urine Glucose (UA)   Negative


 


Urine Ketones   Negative


 


Urine Blood   Negative


 


Urine Nitrite   Negative


 


Urine Bilirubin   Negative


 


Urine Urobilinogen   2.0


 


Ur Leukocyte Esterase   Negative


 


RPR Titer  


 


HIV 1&2 Antibody Screen  Negative 


 


HIV P24 Antigen  Negative 








LABS NOTED.


Assessment: 





05/02/18 11:55


WITHDRAWAL SYMPTOMS.


Plan: 





CONTINUE DETOX.





PATIENT SCHEDULED FOR D/C TOMORROW.

## 2018-05-03 VITALS — DIASTOLIC BLOOD PRESSURE: 60 MMHG | SYSTOLIC BLOOD PRESSURE: 102 MMHG | HEART RATE: 57 BPM | TEMPERATURE: 96.4 F

## 2018-05-03 NOTE — PN
BHS Progress Note (SOAP)


Subjective: 





Patient denies current Detox symptoms and reports that he feels well overall.


Objective: 


PATIENT A & O X 3, OBSERVED AMBULATING ON UNIT. NO ACUTE DISTRESS.





05/03/18 15:47


 Vital Signs











Temperature  96.4 F L  05/03/18 06:18


 


Pulse Rate  57 L  05/03/18 06:18


 


Respiratory Rate  18   05/03/18 06:18


 


Blood Pressure  102/60   05/03/18 06:18


 


O2 Sat by Pulse Oximetry (%)      








 Laboratory Tests











  04/28/18 04/28/18 04/28/18





  09:20 09:20 09:20


 


WBC  6.5  


 


RBC  4.62  


 


Hgb  14.0  


 


Hct  41.3  


 


MCV  89.5  


 


MCH  30.4  


 


MCHC  33.9  


 


RDW  13.8  


 


Plt Count  194  D  


 


MPV  10.3  


 


Sodium   144 


 


Potassium   3.3 L 


 


Chloride   104 


 


Carbon Dioxide   32 


 


Anion Gap   8 


 


BUN   10  D 


 


Creatinine   0.9 


 


Creat Clearance w eGFR   > 60 


 


Random Glucose   76  D 


 


Calcium   8.8 


 


Total Bilirubin   0.6  D 


 


AST   12 L D 


 


ALT   20 


 


Alkaline Phosphatase   75 


 


Total Protein   7.5 


 


Albumin   3.9 


 


Urine Color   


 


Urine Appearance   


 


Urine pH   


 


Ur Specific Gravity   


 


Urine Protein   


 


Urine Glucose (UA)   


 


Urine Ketones   


 


Urine Blood   


 


Urine Nitrite   


 


Urine Bilirubin   


 


Urine Urobilinogen   


 


Ur Leukocyte Esterase   


 


RPR Titer    Nonreactive


 


HIV 1&2 Antibody Screen   


 


HIV P24 Antigen   














  04/28/18 04/28/18





  09:20 09:20


 


WBC  


 


RBC  


 


Hgb  


 


Hct  


 


MCV  


 


MCH  


 


MCHC  


 


RDW  


 


Plt Count  


 


MPV  


 


Sodium  


 


Potassium  


 


Chloride  


 


Carbon Dioxide  


 


Anion Gap  


 


BUN  


 


Creatinine  


 


Creat Clearance w eGFR  


 


Random Glucose  


 


Calcium  


 


Total Bilirubin  


 


AST  


 


ALT  


 


Alkaline Phosphatase  


 


Total Protein  


 


Albumin  


 


Urine Color   Yellow


 


Urine Appearance   Slcloudy


 


Urine pH   6.0


 


Ur Specific Gravity   1.018


 


Urine Protein   Negative


 


Urine Glucose (UA)   Negative


 


Urine Ketones   Negative


 


Urine Blood   Negative


 


Urine Nitrite   Negative


 


Urine Bilirubin   Negative


 


Urine Urobilinogen   2.0


 


Ur Leukocyte Esterase   Negative


 


RPR Titer  


 


HIV 1&2 Antibody Screen  Negative 


 


HIV P24 Antigen  Negative 








LABS NOTED.


Assessment: 





05/03/18 15:47


COMPLETION OF DETOX REGIMEN.


Plan: 





PATIENT SCHEDULED FOR DISCHARGE FROM DETOX UNIT TODAY.

## 2018-05-03 NOTE — DS
BHS Detox Discharge Summary


Admission Date: 


04/27/18





Discharge Date: 05/03/18





- History


Present History: Alcohol Dependence, Opioid Dependence


Additional Comments: 





PATIENT GOING TO MOVE TO [place], [place] WITH FAMILY MEMBER AND WILL 

PURSUE OUTPATIENT PROGRAM WHEN HE IS THERE. POSSIBLE PROGRAMS TO CONSIDER 

OFFERED TO PATIENT BY COUNSELOR. PATIENT WAS DISCHARGED FROM DETOX UNIT IN 

STABLE MEDICAL CONDITION.


Pertinent Past History: 





HTN, Hypercholesterolemia, History of MI, Depression, Insomnia, Nicotine 

Dependence, History of MI.





- Physical Exam Results


Vital Signs: 


 Vital Signs











Temperature  96.4 F L  05/03/18 06:18


 


Pulse Rate  57 L  05/03/18 06:18


 


Respiratory Rate  18   05/03/18 06:18


 


Blood Pressure  102/60   05/03/18 06:18


 


O2 Sat by Pulse Oximetry (%)      











Pertinent Admission Physical Exam Findings: 





WITHDRAWAL SYMPTOMS.





 Laboratory Tests











  04/28/18 04/28/18 04/28/18





  09:20 09:20 09:20


 


WBC  6.5  


 


RBC  4.62  


 


Hgb  14.0  


 


Hct  41.3  


 


MCV  89.5  


 


MCH  30.4  


 


MCHC  33.9  


 


RDW  13.8  


 


Plt Count  194  D  


 


MPV  10.3  


 


Sodium   144 


 


Potassium   3.3 L 


 


Chloride   104 


 


Carbon Dioxide   32 


 


Anion Gap   8 


 


BUN   10  D 


 


Creatinine   0.9 


 


Creat Clearance w eGFR   > 60 


 


Random Glucose   76  D 


 


Calcium   8.8 


 


Total Bilirubin   0.6  D 


 


AST   12 L D 


 


ALT   20 


 


Alkaline Phosphatase   75 


 


Total Protein   7.5 


 


Albumin   3.9 


 


Urine Color   


 


Urine Appearance   


 


Urine pH   


 


Ur Specific Gravity   


 


Urine Protein   


 


Urine Glucose (UA)   


 


Urine Ketones   


 


Urine Blood   


 


Urine Nitrite   


 


Urine Bilirubin   


 


Urine Urobilinogen   


 


Ur Leukocyte Esterase   


 


RPR Titer    Nonreactive


 


HIV 1&2 Antibody Screen   


 


HIV P24 Antigen   














  04/28/18 04/28/18





  09:20 09:20


 


WBC  


 


RBC  


 


Hgb  


 


Hct  


 


MCV  


 


MCH  


 


MCHC  


 


RDW  


 


Plt Count  


 


MPV  


 


Sodium  


 


Potassium  


 


Chloride  


 


Carbon Dioxide  


 


Anion Gap  


 


BUN  


 


Creatinine  


 


Creat Clearance w eGFR  


 


Random Glucose  


 


Calcium  


 


Total Bilirubin  


 


AST  


 


ALT  


 


Alkaline Phosphatase  


 


Total Protein  


 


Albumin  


 


Urine Color   Yellow


 


Urine Appearance   Slcloudy


 


Urine pH   6.0


 


Ur Specific Gravity   1.018


 


Urine Protein   Negative


 


Urine Glucose (UA)   Negative


 


Urine Ketones   Negative


 


Urine Blood   Negative


 


Urine Nitrite   Negative


 


Urine Bilirubin   Negative


 


Urine Urobilinogen   2.0


 


Ur Leukocyte Esterase   Negative


 


RPR Titer  


 


HIV 1&2 Antibody Screen  Negative 


 


HIV P24 Antigen  Negative 








LABS NOTED.





- Treatment


Hospital Course: Detox Protocol Followed, Detoxed Safely, Responded well, 

Discharged Condition Good


Patient has Accepted a Rehab Referral to: PT MOVING TO [place], [place]; WILL 

PURSUE OUTPATIENT PROGRAM ONCE THERE.





- Medication


Discharge Medications: 


Ambulatory Orders





NK [No Known Home Medication]  01/17/18 











- Diagnosis


(1) Opioid dependence with withdrawal


Status: Acute   





(2) Depression


Status: Chronic   


Qualifiers: 


   Depression Type: unspecified   Qualified Code(s): F32.9 - Major depressive 

disorder, single episode, unspecified   





(3) Nicotine dependence


Status: Acute   


Qualifiers: 


   Nicotine product type: cigarettes   Substance use status: uncomplicated   

Qualified Code(s): F17.210 - Nicotine dependence, cigarettes, uncomplicated   





(4) Alcohol dependence with uncomplicated withdrawal


Status: Acute   





(5) Hypertension


Status: Acute   


Qualifiers: 


   Hypertension type: essential hypertension   Qualified Code(s): I10 - 

Essential (primary) hypertension   





(6) Insomnia


Status: Acute   


Qualifiers: 


   Insomnia type: unspecified   Qualified Code(s): G47.00 - Insomnia, 

unspecified   





(7) Substance induced mood disorder


Status: Acute   





- AMA


Did Patient Leave Against Medical Advice: No

## 2018-06-02 ENCOUNTER — HOSPITAL ENCOUNTER (INPATIENT)
Dept: HOSPITAL 74 - YASAS | Age: 53
LOS: 5 days | Discharge: HOME | DRG: 773 | End: 2018-06-07
Attending: SURGERY | Admitting: SURGERY
Payer: COMMERCIAL

## 2018-06-02 VITALS — BODY MASS INDEX: 23.6 KG/M2

## 2018-06-02 DIAGNOSIS — I25.2: ICD-10-CM

## 2018-06-02 DIAGNOSIS — R03.0: ICD-10-CM

## 2018-06-02 DIAGNOSIS — Z91.14: ICD-10-CM

## 2018-06-02 DIAGNOSIS — F10.20: ICD-10-CM

## 2018-06-02 DIAGNOSIS — I10: ICD-10-CM

## 2018-06-02 DIAGNOSIS — Z91.011: ICD-10-CM

## 2018-06-02 DIAGNOSIS — Z87.898: ICD-10-CM

## 2018-06-02 DIAGNOSIS — F19.24: ICD-10-CM

## 2018-06-02 DIAGNOSIS — Z91.013: ICD-10-CM

## 2018-06-02 DIAGNOSIS — Z59.0: ICD-10-CM

## 2018-06-02 DIAGNOSIS — E78.5: ICD-10-CM

## 2018-06-02 DIAGNOSIS — F17.213: ICD-10-CM

## 2018-06-02 DIAGNOSIS — K08.9: ICD-10-CM

## 2018-06-02 DIAGNOSIS — F11.23: Primary | ICD-10-CM

## 2018-06-02 DIAGNOSIS — F33.9: ICD-10-CM

## 2018-06-02 PROCEDURE — HZ2ZZZZ DETOXIFICATION SERVICES FOR SUBSTANCE ABUSE TREATMENT: ICD-10-PCS | Performed by: SURGERY

## 2018-06-02 RX ADMIN — NICOTINE SCH: 21 PATCH TRANSDERMAL at 18:34

## 2018-06-02 RX ADMIN — Medication SCH MG: at 22:12

## 2018-06-02 SDOH — ECONOMIC STABILITY - HOUSING INSECURITY: HOMELESSNESS: Z59.0

## 2018-06-02 NOTE — HP
COWS





- Scale


Resting Pulse: 1= SD 


Sweatin= Chills/Flushing


Restless Observation: 1= Difficult to Sit Still


Pupil Size: 0= Normal to Room Light


Bone or Joint Aches: 1= Mild Discomfort


Runny Nose/ Eye Tearin= Runny Nose/Eyes


GI Upset > 30mins: 2= Nausea/Diarrhea


Tremor Observation: 2= Slight Tremor Visible


Yawning Observation: 1= 1-2x During Session


Anxiety or Irritability: 1=Feels Anxious/Irritable


Goose Flesh Skin: 0=Smooth Skin


COWS Score: 12





Admission ROS S





- HPI


Chief Complaint: 


I hate my life, I can't get away from this drug, I can't stop.


Allergies/Adverse Reactions: 


 Allergies











Allergy/AdvReac Type Severity Reaction Status Date / Time


 


shellfish derived Allergy Severe Swelling Verified 18 23:15


 


tomato Allergy Severe Swelling Verified 18 23:15


 


Fish Containing Products Allergy Mild Rash Verified 18 23:15


 


No Known Drug Allergies Allergy   Verified 18 23:15


 


cheese AdvReac Intermediate Swelling Verified 18 23:15











History of Present Illness: 


52 yo gentleman here for detox from alcohol and heroin - this is one of 

multiple admissions for treatment, last here 18.  [facility] 

was on suboxone 12mg given 30 day Rx on 18 and also given suboxone in 

[place] on /


 he takes some,sells some.  [facility] has had overdoses - most recently in 

[place] a few weeks ago. No seizures but does have black outs.  Although 

he drinks, states not alot and not daily, does not drink first thing and does 

not get withdrawal symptoms if he does not drink..


Exam Limitations: Clinical Condition





- Ebola screening


Have you traveled outside of the country in the last 21 days: No (N)


Have you had contact with anyone from an Ebola affected area: No


Have you been sick,other than usual withdrawal symptoms: No


Do you have a fever: No





- Review of Systems


Constitutional: Loss of Appetite, Malaise, Changes in sleep, Weakness


EENT: reports: Nose Congestion


Respiratory: reports: No Symptoms reported


Cardiac: reports: No Symptoms Reported


GI: reports: Nausea, Poor Appetite, Abdominal cramping


: reports: Burning


Musculoskeletal: reports: Back Pain, Muscle Pain


Integumentary: reports: No Symptoms Reported


Neuro: reports: Headache, Tremors


Endocrine: reports: No Symptoms Reported


Hematology: reports: No Symptoms Reported


Psychiatric: reports: Judgement Intact, Mood/Affect Appropiate, Anxious


Other Systems: Reviewed and Negative





Patient History





- Patient Medical History


Hx Anemia: No


Hx Asthma: No


Hx Chronic Obstructive Pulmonary Disease (COPD): No


Hx Cancer: No


Hx Cardiac Disorders: Yes (MI approx. 10 years ago.)


Hx Congestive Heart Failure: No


Hx Hypertension: Yes (no meds)


Hx Hypercholesterolemia: Yes (non adherent)


Hx Pacemaker: No


HX Cerebrovascular Accident: No


Hx Seizures: No


Hx Dementia: No


Hx Diabetes: No


Hx Gastrointestinal Disorders: No


Hx Liver Disease: No


Hx Genitourinary Disorders: No


Hx Sexually Transmitted Disorders: No


Hx Renal Disease (ESRD): No


Hx Thyroid Disease: No


Hx Human Immunodeficiency Virus (HIV): No (Last Tested: : NEGATIVE)


Hx Hepatitis C: No


Hx Depression: Yes (hx meds, hospitalized in [place] a month ago)


Hx Suicide Attempt: No (PATIENT DENIES CURRENT SI / HI.)


Hx Bipolar Disorder: No


Hx Schizophrenia: No





- Patient Surgical History


Past Surgical History: No


Hx Neurologic Surgery: No


Hx Cataract Extraction: No


Hx Cardiac Surgery: No


Hx Lung Surgery: No


Hx Breast Surgery: No


Hx Breast Biopsy: No


Hx Abdominal Surgery: No


Hx Appendectomy: No


Hx Cholecystectomy: No


Hx Genitourinary Surgery: No


Hx  Section: No


Hx Orthopedic Surgery: No


Anesthesia Reaction: No





- PPD History


Previous Implant?: Yes


Documented Results: Positive w/proof


Implanted On Prior [facility] Admission?: Yes


Date: 18


Results: 0MM


PPD to be Administered?: Yes





- Reproductive History


Patient is a Female of Child Bearing Age (11 -55 yrs old): No (male)





- Smoking Cessation


Smoking history: Current every day smoker


Have you smoked in the past 12 months: Yes


Aproximately how many cigarettes per day: 20


Cigars Per Day: 0


Hx Chewing Tobacco Use: No


Initiated information on smoking cessation: Yes


'Breaking Loose' booklet given: 18 (give on floor)





- Substance & Tx. History


Hx Alcohol Use: Yes (on and off, not every day)


Hx Substance Use: Yes


Substance Use Type: Alcohol, Heroin


Hx Substance Use Treatment: Yes (hx detox, suboxone, rehab, methadone program 

years ago)





- Substances Abused


  ** Heroin


Route: Inhalation


Frequency: Daily


Amount used: 8 bags


Age of first use: 15


Date of Last Use: 18





  ** Alcohol


Route: Oral


Frequency: 1-2 times per week


Amount used: one six pack 22 oz 


Age of first use: 15


Date of Last Use: 18





Family Disease History





- Family Disease History


Family Disease History: Heart Disease: Father (MI,), Mother (ETOH 

DEPENDENT AND ,MI), Brother (MI; Bipolar Disorder.), Sister (MI), Other: 

Mother, Brother, Son (2 sons - one  in MVA, one son with Downs), 

Daughter (one daughter murdered (thrown out window))





Admission Physical Exam BHS





- Vital Signs


Vital Signs: 


 Vital Signs - 24 hr











  18





  12:13


 


Temperature 98 F


 


Pulse Rate 91 H


 


Respiratory 18





Rate 


 


Blood Pressure 144/81














- Physical


General Appearance: Yes: Nourished, Appropriately Dressed, Moderate Distress, 

Anxious


HEENTM: Yes: Hearing grossly Normal, Normal ENT Inspection, Normocephalic, 

Normal Voice, Pharynx Normal, Other (poor dentition)


Respiratory: Yes: Normal Breath Sounds, No Respiratory Distress


Neck: Yes: No masses,lesions,Nodules, Supple


Breast: Yes: Breast Exam Deferred


Cardiology: Yes: Regular Rhythm, Regular Rate


Abdominal: Yes: Non Tender, Flat


Genitourinary: Yes: Burning


Back: Yes: Normal Inspection


Musculoskeletal: Yes: full range of Motion, Gait Steady


Extremities: Yes: Normal Inspection, Normal Range of Motion, Non-Tender


Neurological: Yes: Fully Oriented, Alert, Motor Strength 5/5, Normal Mood/Affect

, Normal Response


Integumentary: Yes: Normal Color, Dry, Warm


Lymphatic: Yes: Within Normal Limits





- Diagnostic


(1) Opioid dependence with withdrawal


Current Visit: Yes   Status: Acute   





(2) Nicotine dependence, uncomplicated


Current Visit: Yes   Status: Chronic   


Qualifiers: 


   Nicotine product type: cigarettes   Qualified Code(s): F17.210 - Nicotine 

dependence, cigarettes, uncomplicated   





(3) History of MI (myocardial infarction)


Current Visit: Yes   Status: Resolved   





(4) Borderline high blood pressure


Current Visit: Yes   Status: Chronic   





(5) Hypercholesterolemia


Current Visit: Yes   Status: Chronic   





Cleared for Admission [facility]





- Detox or Rehab


[facility] Level of Care: Medically Managed


Detox Regimen/Protocol: Methadone





BHS Breath Alcohol Content


Breath Alcohol Content: 0





Urine Drug Screen





- Results


Drug Screen Negative: No


Urine Drug Screen Results: OPI-Opiates

## 2018-06-03 LAB
ALBUMIN SERPL-MCNC: 3.8 G/DL (ref 3.4–5)
ALP SERPL-CCNC: 91 U/L (ref 45–117)
ALT SERPL-CCNC: 18 U/L (ref 12–78)
ANION GAP SERPL CALC-SCNC: 10 MMOL/L (ref 8–16)
APPEARANCE UR: (no result)
AST SERPL-CCNC: 6 U/L (ref 15–37)
BILIRUB SERPL-MCNC: 0.4 MG/DL (ref 0.2–1)
BILIRUB UR STRIP.AUTO-MCNC: NEGATIVE MG/DL
BUN SERPL-MCNC: 12 MG/DL (ref 7–18)
CALCIUM SERPL-MCNC: 8.6 MG/DL (ref 8.5–10.1)
CHLORIDE SERPL-SCNC: 104 MMOL/L (ref 98–107)
CO2 SERPL-SCNC: 29 MMOL/L (ref 21–32)
COLOR UR: (no result)
CREAT SERPL-MCNC: 1 MG/DL (ref 0.7–1.3)
DEPRECATED RDW RBC AUTO: 14.2 % (ref 11.9–15.9)
EPITH CASTS URNS QL MICRO: (no result) /HPF
GLUCOSE SERPL-MCNC: 93 MG/DL (ref 74–106)
HCT VFR BLD CALC: 41.8 % (ref 35.4–49)
HGB BLD-MCNC: 13.6 GM/DL (ref 11.7–16.9)
KETONES UR QL STRIP: NEGATIVE
LEUKOCYTE ESTERASE UR QL STRIP.AUTO: NEGATIVE
MCH RBC QN AUTO: 29.9 PG (ref 25.7–33.7)
MCHC RBC AUTO-ENTMCNC: 32.6 G/DL (ref 32–35.9)
MCV RBC: 91.6 FL (ref 80–96)
MUCOUS THREADS URNS QL MICRO: (no result)
NITRITE UR QL STRIP: NEGATIVE
PH UR: 5 [PH] (ref 5–8)
PLATELET # BLD AUTO: 230 K/MM3 (ref 134–434)
PMV BLD: 9.9 FL (ref 7.5–11.1)
POTASSIUM SERPLBLD-SCNC: 4 MMOL/L (ref 3.5–5.1)
PROT SERPL-MCNC: 7.4 G/DL (ref 6.4–8.2)
PROT UR QL STRIP: (no result)
PROT UR QL STRIP: NEGATIVE
RBC # BLD AUTO: 4.57 M/MM3 (ref 4–5.6)
RBC # UR STRIP: NEGATIVE /UL
SODIUM SERPL-SCNC: 143 MMOL/L (ref 136–145)
SP GR UR: 1.03 (ref 1–1.03)
UROBILINOGEN UR STRIP-MCNC: NEGATIVE MG/DL (ref 0.2–1)
WBC # BLD AUTO: 5.9 K/MM3 (ref 4–10)

## 2018-06-03 RX ADMIN — Medication SCH MG: at 22:09

## 2018-06-03 RX ADMIN — NICOTINE SCH: 21 PATCH TRANSDERMAL at 10:05

## 2018-06-03 RX ADMIN — SERTRALINE HYDROCHLORIDE SCH MG: 50 TABLET ORAL at 14:10

## 2018-06-03 RX ADMIN — Medication SCH TAB: at 10:04

## 2018-06-03 NOTE — CONSULT
BHS Psychiatric Consult





- Data


Date of interview: 06/03/18


Admission source: Self-referred


Identifying data: Mr Garcia is a 53 years old single , father of 3 

children, unemployed on food stamp, homeless seeking detox treatment alcohol 

and opioid


Substance Abuse History: Reports history of alcohol and heroin use. Refer to 

addiction counselor's summary for further information


Medical History: Significant for hypertension, dyslipidemia, history of 

treatment for myocardial infarction 10 years ago. Smokes cigarettes 1ppd


Psychiatric History: Reports being diagnosed with depression at age 18 and 

started on medication. Reports multiple psychiatric admissions to various 

facilities including [facility] ([facility] and recently [facility] in April 2018 and [facility] in [place] 3 weeks ago. Reports 

that he was discharged on Seroquel and Ambien from [place]. According to 

pharmacy claims, scripts for Zoloft 50 mg and Trazadone 50 mg were filled on 4/ 24/18. Reports no current OPD care and not on medication. At present, reports 

feeling mildly depressed but sleeping well. Requests to resume Zoloft


Physical/Sexual Abuse/Trauma History: Denies history of emotional, physical or 

sexual abuse as well DV relationship


Additional Comment: Reports history of multiple previous arrests including 3 

felony convictions. Denies being om parole/probation at present





Mental Status Exam





- Mental Status Exam


Alert and Oriented to: Time, Place, Person


Cognitive Function: Fair


Patient Appearance: Well Groomed


Mood: Depressed (mildly)


Affect: Appropriate


Patient Behavior: Cooperative


Speech Pattern: Clear


Voice Loudness: Normal


Thought Process: Intact, Goal Oriented


Hallucinations: Denies


Suicidal Ideation: Denies


Homicidal Ideation: Denies


Insight/Judgement: Poor


Sleep: Well


Appetite: Good


Muscle strength/Tone: Normal


Gait/Station: Normal





Psychiatric Findings





- Problem List (Axis 1, 2,3)


(1) MDD (major depressive disorder)


Current Visit: No   Status: Chronic   





(2) Substance induced mood disorder


Current Visit: Yes   Status: Acute   





(3) Alcohol dependence with uncomplicated withdrawal


Current Visit: No   Status: Acute   





(4) Opioid dependence with withdrawal


Current Visit: Yes   Status: Acute   





(5) Nicotine dependence, uncomplicated


Current Visit: Yes   Status: Chronic   


Qualifiers: 


   Nicotine product type: cigarettes   Qualified Code(s): F17.210 - Nicotine 

dependence, cigarettes, uncomplicated   





(6) Hypercholesterolemia


Current Visit: Yes   Status: Chronic   





(7) History of MI (myocardial infarction)


Current Visit: Yes   Status: Resolved   





(8) Hypertension


Current Visit: No   Status: Chronic   


Qualifiers: 


   Hypertension type: essential hypertension   Qualified Code(s): I10 - 

Essential (primary) hypertension   





- Initial Treatment Plan


Initial Treatment Plan: 1) Start Zoloft 50 mg po daily.  2) Continue inpatient 

detoxification

## 2018-06-03 NOTE — PN
BHS COWS





- Scale


Resting Pulse: 0= [place] 80 or Below


Sweatin= Chills/Flushing


Restless Observation: 1= Difficult to Sit Still


Pupil Size: 1= Pupils >than Normal


Bone or Joint Aches: 2= Severe Diffuse Aches


Runny Nose/ Eye Tearin= Nasal Congestion


GI Upset > 30mins: 1= Stomach Cramp


Tremor Observation of Outstretched Hands: 2= Slight Tremor Visible


Yawning Observation: 1= 1-2x During Session


Anxiety or Irritability: 1=Feels Anxious/Irritable


Goose Flesh Skin: 0=Smooth Skin


COWS Score: 11





BHS Progress Note (SOAP)


Subjective: 





joint pain body ache tremor sweat anxiety restlessness trouble sleep at night





Objective: 





18 13:31


 Vital Signs











Temperature  98.1 F   18 09:18


 


Pulse Rate  73   18 09:18


 


Respiratory Rate  18   18 09:18


 


Blood Pressure  121/69   18 09:18


 


O2 Sat by Pulse Oximetry (%)      








 Laboratory Last Values











WBC  5.9 K/mm3 (4.0-10.0)   18  07:40    


 


RBC  4.57 M/mm3 (4.00-5.60)   18  07:40    


 


Hgb  13.6 GM/dL (11.7-16.9)   18  07:40    


 


Hct  41.8 % (35.4-49)   18  07:40    


 


MCV  91.6 fl (80-96)   18  07:40    


 


MCH  29.9 pg (25.7-33.7)   18  07:40    


 


MCHC  32.6 g/dl (32.0-35.9)   18  07:40    


 


RDW  14.2 % (11.9-15.9)   18  07:40    


 


Plt Count  230 K/MM3 (134-434)   18  07:40    


 


MPV  9.9 fl (7.5-11.1)   18  07:40    


 


Sodium  143 mmol/L (136-145)   18  07:40    


 


Potassium  4.0 mmol/L (3.5-5.1)  D 18  07:40    


 


Chloride  104 mmol/L ()   18  07:40    


 


Carbon Dioxide  29 mmol/L (21-32)   18  07:40    


 


Anion Gap  10  (8-16)   18  07:40    


 


BUN  12 mg/dL (7-18)   18  07:40    


 


Creatinine  1.0 mg/dL (0.7-1.3)   18  07:40    


 


Creat Clearance w eGFR  > 60  (>60)   18  07:40    


 


Random Glucose  93 mg/dL ()  D 18  07:40    


 


Calcium  8.6 mg/dL (8.5-10.1)   18  07:40    


 


Total Bilirubin  0.4 mg/dL (0.2-1.0)  D 18  07:40    


 


AST  6 U/L (15-37)  L D 18  07:40    


 


ALT  18 U/L (12-78)   18  07:40    


 


Alkaline Phosphatase  91 U/L ()  D 18  07:40    


 


Total Protein  7.4 g/dl (6.4-8.2)   18  07:40    


 


Albumin  3.8 g/dl (3.4-5.0)   18  07:40    


 


Urine Color  Chelsea   18  08:15    


 


Urine Appearance  Turbid   18  08:15    


 


Urine pH  5.0  (5.0-8.0)   18  08:15    


 


Ur Specific Gravity  1.027  (1.001-1.035)   18  08:15    


 


Urine Protein  1+  (NEGATIVE)  H  18  08:15    


 


Urine Glucose (UA)  Negative  (NEGATIVE)   18  08:15    


 


Urine Ketones  Negative  (NEGATIVE)   18  08:15    


 


Urine Blood  Negative  (NEGATIVE)   18  08:15    


 


Urine Nitrite  Negative  (NEGATIVE)   18  08:15    


 


Urine Bilirubin  Negative  (<2.0 mg/dL)   18  08:15    


 


Urine Urobilinogen  Negative mg/dL (0.2-1.0)   18  08:15    


 


Ur Leukocyte Esterase  Negative  (NEGATIVE)   18  08:15    


 


Urine WBC (Auto)  None /hpf (3-5)   18  08:15    


 


Urine RBC (Auto)  None /hpf (0-3)   18  08:15    


 


Ur Epithelial Cells  Rare /HPF (FEW)   18  08:15    


 


Urine Mucus  Many   18  08:15    


 


RPR Titer  Nonreactive  (NONREACTIVE)   18  07:40    








lab noted


Assessment: 





18 13:31


withdrawal sx


Plan: 





continue detox

## 2018-06-04 RX ADMIN — NICOTINE SCH: 21 PATCH TRANSDERMAL at 10:35

## 2018-06-04 RX ADMIN — Medication SCH MG: at 22:33

## 2018-06-04 RX ADMIN — Medication SCH TAB: at 10:33

## 2018-06-04 RX ADMIN — SERTRALINE HYDROCHLORIDE SCH MG: 50 TABLET ORAL at 10:33

## 2018-06-04 NOTE — PN
BHS COWS





- Scale


Resting Pulse: 0= [place] 80 or Below


Sweatin= Chills/Flushing


Restless Observation: 1= Difficult to Sit Still


Pupil Size: 1= Pupils >than Normal


Bone or Joint Aches: 2= Severe Diffuse Aches


Runny Nose/ Eye Tearin= Nasal Congestion


GI Upset > 30mins: 1= Stomach Cramp


Tremor Observation of Outstretched Hands: 1= Tremor Felt, Not Seen


Yawning Observation: 1= 1-2x During Session


Anxiety or Irritability: 1=Feels Anxious/Irritable


Goose Flesh Skin: 0=Smooth Skin


COWS Score: 10





BHS Progress Note (SOAP)


Subjective: 





joint pain body ache tremor irritable anxiety


Objective: 





18 11:39


 Vital Signs











Temperature  98.2 F   18 09:48


 


Pulse Rate  75   18 09:48


 


Respiratory Rate  18   18 09:48


 


Blood Pressure  114/64   18 09:48


 


O2 Sat by Pulse Oximetry (%)      








 Laboratory Last Values











WBC  5.9 K/mm3 (4.0-10.0)   18  07:40    


 


RBC  4.57 M/mm3 (4.00-5.60)   18  07:40    


 


Hgb  13.6 GM/dL (11.7-16.9)   18  07:40    


 


Hct  41.8 % (35.4-49)   18  07:40    


 


MCV  91.6 fl (80-96)   18  07:40    


 


MCH  29.9 pg (25.7-33.7)   18  07:40    


 


MCHC  32.6 g/dl (32.0-35.9)   18  07:40    


 


RDW  14.2 % (11.9-15.9)   18  07:40    


 


Plt Count  230 K/MM3 (134-434)   18  07:40    


 


MPV  9.9 fl (7.5-11.1)   18  07:40    


 


Sodium  143 mmol/L (136-145)   18  07:40    


 


Potassium  4.0 mmol/L (3.5-5.1)  D 18  07:40    


 


Chloride  104 mmol/L ()   18  07:40    


 


Carbon Dioxide  29 mmol/L (21-32)   18  07:40    


 


Anion Gap  10  (8-16)   18  07:40    


 


BUN  12 mg/dL (7-18)   18  07:40    


 


Creatinine  1.0 mg/dL (0.7-1.3)   18  07:40    


 


Creat Clearance w eGFR  > 60  (>60)   18  07:40    


 


Random Glucose  93 mg/dL ()  D 18  07:40    


 


Calcium  8.6 mg/dL (8.5-10.1)   18  07:40    


 


Total Bilirubin  0.4 mg/dL (0.2-1.0)  D 18  07:40    


 


AST  6 U/L (15-37)  L D 18  07:40    


 


ALT  18 U/L (12-78)   18  07:40    


 


Alkaline Phosphatase  91 U/L ()  D 18  07:40    


 


Total Protein  7.4 g/dl (6.4-8.2)   18  07:40    


 


Albumin  3.8 g/dl (3.4-5.0)   18  07:40    


 


Urine Color  Chelsea   18  08:15    


 


Urine Appearance  Turbid   18  08:15    


 


Urine pH  5.0  (5.0-8.0)   18  08:15    


 


Ur Specific Gravity  1.027  (1.001-1.035)   18  08:15    


 


Urine Protein  1+  (NEGATIVE)  H  18  08:15    


 


Urine Glucose (UA)  Negative  (NEGATIVE)   18  08:15    


 


Urine Ketones  Negative  (NEGATIVE)   18  08:15    


 


Urine Blood  Negative  (NEGATIVE)   18  08:15    


 


Urine Nitrite  Negative  (NEGATIVE)   18  08:15    


 


Urine Bilirubin  Negative  (<2.0 mg/dL)   18  08:15    


 


Urine Urobilinogen  Negative mg/dL (0.2-1.0)   18  08:15    


 


Ur Leukocyte Esterase  Negative  (NEGATIVE)   18  08:15    


 


Urine WBC (Auto)  None /hpf (3-5)   18  08:15    


 


Urine RBC (Auto)  None /hpf (0-3)   18  08:15    


 


Ur Epithelial Cells  Rare /HPF (FEW)   18  08:15    


 


Urine Mucus  Many   18  08:15    


 


RPR Titer  Nonreactive  (NONREACTIVE)   18  07:40    








lab noted


Assessment: 





18 11:39


withdrawal sx


Plan: 





continue detox

## 2018-06-05 RX ADMIN — Medication SCH MG: at 22:26

## 2018-06-05 RX ADMIN — Medication PRN MG: at 22:27

## 2018-06-05 RX ADMIN — Medication SCH TAB: at 10:16

## 2018-06-05 RX ADMIN — NICOTINE SCH: 21 PATCH TRANSDERMAL at 10:16

## 2018-06-05 RX ADMIN — SERTRALINE HYDROCHLORIDE SCH MG: 50 TABLET ORAL at 10:16

## 2018-06-05 NOTE — PN
BHS Progress Note (SOAP)


Subjective: 





joint pain body ache sweat tremor irritable agitative restlessness


Objective: 





06/05/18 11:45


 Vital Signs











Temperature  98.4 F   06/05/18 09:13


 


Pulse Rate  74   06/05/18 09:13


 


Respiratory Rate  18   06/05/18 09:13


 


Blood Pressure  131/75   06/05/18 09:13


 


O2 Sat by Pulse Oximetry (%)      








 Laboratory Last Values











WBC  5.9 K/mm3 (4.0-10.0)   06/03/18  07:40    


 


RBC  4.57 M/mm3 (4.00-5.60)   06/03/18  07:40    


 


Hgb  13.6 GM/dL (11.7-16.9)   06/03/18  07:40    


 


Hct  41.8 % (35.4-49)   06/03/18  07:40    


 


MCV  91.6 fl (80-96)   06/03/18  07:40    


 


MCH  29.9 pg (25.7-33.7)   06/03/18  07:40    


 


MCHC  32.6 g/dl (32.0-35.9)   06/03/18  07:40    


 


RDW  14.2 % (11.9-15.9)   06/03/18  07:40    


 


Plt Count  230 K/MM3 (134-434)   06/03/18  07:40    


 


MPV  9.9 fl (7.5-11.1)   06/03/18  07:40    


 


Sodium  143 mmol/L (136-145)   06/03/18  07:40    


 


Potassium  4.0 mmol/L (3.5-5.1)  D 06/03/18  07:40    


 


Chloride  104 mmol/L ()   06/03/18  07:40    


 


Carbon Dioxide  29 mmol/L (21-32)   06/03/18  07:40    


 


Anion Gap  10  (8-16)   06/03/18  07:40    


 


BUN  12 mg/dL (7-18)   06/03/18  07:40    


 


Creatinine  1.0 mg/dL (0.7-1.3)   06/03/18  07:40    


 


Creat Clearance w eGFR  > 60  (>60)   06/03/18  07:40    


 


Random Glucose  93 mg/dL ()  D 06/03/18  07:40    


 


Calcium  8.6 mg/dL (8.5-10.1)   06/03/18  07:40    


 


Total Bilirubin  0.4 mg/dL (0.2-1.0)  D 06/03/18  07:40    


 


AST  6 U/L (15-37)  L D 06/03/18  07:40    


 


ALT  18 U/L (12-78)   06/03/18  07:40    


 


Alkaline Phosphatase  91 U/L ()  D 06/03/18  07:40    


 


Total Protein  7.4 g/dl (6.4-8.2)   06/03/18  07:40    


 


Albumin  3.8 g/dl (3.4-5.0)   06/03/18  07:40    


 


Urine Color  Chelsea   06/03/18  08:15    


 


Urine Appearance  Turbid   06/03/18  08:15    


 


Urine pH  5.0  (5.0-8.0)   06/03/18  08:15    


 


Ur Specific Gravity  1.027  (1.001-1.035)   06/03/18  08:15    


 


Urine Protein  1+  (NEGATIVE)  H  06/03/18  08:15    


 


Urine Glucose (UA)  Negative  (NEGATIVE)   06/03/18  08:15    


 


Urine Ketones  Negative  (NEGATIVE)   06/03/18  08:15    


 


Urine Blood  Negative  (NEGATIVE)   06/03/18  08:15    


 


Urine Nitrite  Negative  (NEGATIVE)   06/03/18  08:15    


 


Urine Bilirubin  Negative  (<2.0 mg/dL)   06/03/18  08:15    


 


Urine Urobilinogen  Negative mg/dL (0.2-1.0)   06/03/18  08:15    


 


Ur Leukocyte Esterase  Negative  (NEGATIVE)   06/03/18  08:15    


 


Urine WBC (Auto)  None /hpf (3-5)   06/03/18  08:15    


 


Urine RBC (Auto)  None /hpf (0-3)   06/03/18  08:15    


 


Ur Epithelial Cells  Rare /HPF (FEW)   06/03/18  08:15    


 


Urine Mucus  Many   06/03/18  08:15    


 


RPR Titer  Nonreactive  (NONREACTIVE)   06/03/18  07:40    








lab noted


Assessment: 





06/05/18 11:45


 Vital Signs











Temperature  98.4 F   06/05/18 09:13


 


Pulse Rate  74   06/05/18 09:13


 


Respiratory Rate  18   06/05/18 09:13


 


Blood Pressure  131/75   06/05/18 09:13


 


O2 Sat by Pulse Oximetry (%)      








 Laboratory Last Values











WBC  5.9 K/mm3 (4.0-10.0)   06/03/18  07:40    


 


RBC  4.57 M/mm3 (4.00-5.60)   06/03/18  07:40    


 


Hgb  13.6 GM/dL (11.7-16.9)   06/03/18  07:40    


 


Hct  41.8 % (35.4-49)   06/03/18  07:40    


 


MCV  91.6 fl (80-96)   06/03/18  07:40    


 


MCH  29.9 pg (25.7-33.7)   06/03/18  07:40    


 


MCHC  32.6 g/dl (32.0-35.9)   06/03/18  07:40    


 


RDW  14.2 % (11.9-15.9)   06/03/18  07:40    


 


Plt Count  230 K/MM3 (134-434)   06/03/18  07:40    


 


MPV  9.9 fl (7.5-11.1)   06/03/18  07:40    


 


Sodium  143 mmol/L (136-145)   06/03/18  07:40    


 


Potassium  4.0 mmol/L (3.5-5.1)  D 06/03/18  07:40    


 


Chloride  104 mmol/L ()   06/03/18  07:40    


 


Carbon Dioxide  29 mmol/L (21-32)   06/03/18  07:40    


 


Anion Gap  10  (8-16)   06/03/18  07:40    


 


BUN  12 mg/dL (7-18)   06/03/18  07:40    


 


Creatinine  1.0 mg/dL (0.7-1.3)   06/03/18  07:40    


 


Creat Clearance w eGFR  > 60  (>60)   06/03/18  07:40    


 


Random Glucose  93 mg/dL ()  D 06/03/18  07:40    


 


Calcium  8.6 mg/dL (8.5-10.1)   06/03/18  07:40    


 


Total Bilirubin  0.4 mg/dL (0.2-1.0)  D 06/03/18  07:40    


 


AST  6 U/L (15-37)  L D 06/03/18  07:40    


 


ALT  18 U/L (12-78)   06/03/18  07:40    


 


Alkaline Phosphatase  91 U/L ()  D 06/03/18  07:40    


 


Total Protein  7.4 g/dl (6.4-8.2)   06/03/18  07:40    


 


Albumin  3.8 g/dl (3.4-5.0)   06/03/18  07:40    


 


Urine Color  Chelsea   06/03/18  08:15    


 


Urine Appearance  Turbid   06/03/18  08:15    


 


Urine pH  5.0  (5.0-8.0)   06/03/18  08:15    


 


Ur Specific Gravity  1.027  (1.001-1.035)   06/03/18  08:15    


 


Urine Protein  1+  (NEGATIVE)  H  06/03/18  08:15    


 


Urine Glucose (UA)  Negative  (NEGATIVE)   06/03/18  08:15    


 


Urine Ketones  Negative  (NEGATIVE)   06/03/18  08:15    


 


Urine Blood  Negative  (NEGATIVE)   06/03/18  08:15    


 


Urine Nitrite  Negative  (NEGATIVE)   06/03/18  08:15    


 


Urine Bilirubin  Negative  (<2.0 mg/dL)   06/03/18  08:15    


 


Urine Urobilinogen  Negative mg/dL (0.2-1.0)   06/03/18  08:15    


 


Ur Leukocyte Esterase  Negative  (NEGATIVE)   06/03/18  08:15    


 


Urine WBC (Auto)  None /hpf (3-5)   06/03/18  08:15    


 


Urine RBC (Auto)  None /hpf (0-3)   06/03/18  08:15    


 


Ur Epithelial Cells  Rare /HPF (FEW)   06/03/18  08:15    


 


Urine Mucus  Many   06/03/18  08:15    


 


RPR Titer  Nonreactive  (NONREACTIVE)   06/03/18  07:40    








lab noted


Plan: 





continue detox no

## 2018-06-06 VITALS — SYSTOLIC BLOOD PRESSURE: 155 MMHG | DIASTOLIC BLOOD PRESSURE: 95 MMHG | TEMPERATURE: 98.1 F

## 2018-06-06 VITALS — HEART RATE: 72 BPM

## 2018-06-06 RX ADMIN — NYSTATIN SCH UNITS: 100000 SUSPENSION ORAL at 14:49

## 2018-06-06 RX ADMIN — NYSTATIN SCH: 100000 SUSPENSION ORAL at 23:52

## 2018-06-06 RX ADMIN — Medication SCH MG: at 22:18

## 2018-06-06 RX ADMIN — Medication SCH TAB: at 10:03

## 2018-06-06 RX ADMIN — SERTRALINE HYDROCHLORIDE SCH MG: 50 TABLET ORAL at 10:04

## 2018-06-06 RX ADMIN — NYSTATIN SCH UNITS: 100000 SUSPENSION ORAL at 17:21

## 2018-06-06 RX ADMIN — NICOTINE SCH: 21 PATCH TRANSDERMAL at 10:35

## 2018-06-06 RX ADMIN — Medication PRN MG: at 22:18

## 2018-06-06 NOTE — PN
[facility] Progress Note (SOAP)


Subjective: 





feeling better no tremor less sweat denies body ache social with peers in day 

room 


good appetite 


Objective: 





06/06/18 10:09


 Vital Signs











Temperature  96.2 F L  06/06/18 06:26


 


Pulse Rate  60   06/06/18 06:26


 


Respiratory Rate  18   06/06/18 06:26


 


Blood Pressure  140/91   06/06/18 06:26


 


O2 Sat by Pulse Oximetry (%)      








 Laboratory Last Values











WBC  5.9 K/mm3 (4.0-10.0)   06/03/18  07:40    


 


RBC  4.57 M/mm3 (4.00-5.60)   06/03/18  07:40    


 


Hgb  13.6 GM/dL (11.7-16.9)   06/03/18  07:40    


 


Hct  41.8 % (35.4-49)   06/03/18  07:40    


 


MCV  91.6 fl (80-96)   06/03/18  07:40    


 


MCH  29.9 pg (25.7-33.7)   06/03/18  07:40    


 


MCHC  32.6 g/dl (32.0-35.9)   06/03/18  07:40    


 


RDW  14.2 % (11.9-15.9)   06/03/18  07:40    


 


Plt Count  230 K/MM3 (134-434)   06/03/18  07:40    


 


MPV  9.9 fl (7.5-11.1)   06/03/18  07:40    


 


Sodium  143 mmol/L (136-145)   06/03/18  07:40    


 


Potassium  4.0 mmol/L (3.5-5.1)  D 06/03/18  07:40    


 


Chloride  104 mmol/L ()   06/03/18  07:40    


 


Carbon Dioxide  29 mmol/L (21-32)   06/03/18  07:40    


 


Anion Gap  10  (8-16)   06/03/18  07:40    


 


BUN  12 mg/dL (7-18)   06/03/18  07:40    


 


Creatinine  1.0 mg/dL (0.7-1.3)   06/03/18  07:40    


 


Creat Clearance w eGFR  > 60  (>60)   06/03/18  07:40    


 


Random Glucose  93 mg/dL ()  D 06/03/18  07:40    


 


Calcium  8.6 mg/dL (8.5-10.1)   06/03/18  07:40    


 


Total Bilirubin  0.4 mg/dL (0.2-1.0)  D 06/03/18  07:40    


 


AST  6 U/L (15-37)  L D 06/03/18  07:40    


 


ALT  18 U/L (12-78)   06/03/18  07:40    


 


Alkaline Phosphatase  91 U/L ()  D 06/03/18  07:40    


 


Total Protein  7.4 g/dl (6.4-8.2)   06/03/18  07:40    


 


Albumin  3.8 g/dl (3.4-5.0)   06/03/18  07:40    


 


Urine Color  Chelsea   06/03/18  08:15    


 


Urine Appearance  Turbid   06/03/18  08:15    


 


Urine pH  5.0  (5.0-8.0)   06/03/18  08:15    


 


Ur Specific Gravity  1.027  (1.001-1.035)   06/03/18  08:15    


 


Urine Protein  1+  (NEGATIVE)  H  06/03/18  08:15    


 


Urine Glucose (UA)  Negative  (NEGATIVE)   06/03/18  08:15    


 


Urine Ketones  Negative  (NEGATIVE)   06/03/18  08:15    


 


Urine Blood  Negative  (NEGATIVE)   06/03/18  08:15    


 


Urine Nitrite  Negative  (NEGATIVE)   06/03/18  08:15    


 


Urine Bilirubin  Negative  (<2.0 mg/dL)   06/03/18  08:15    


 


Urine Urobilinogen  Negative mg/dL (0.2-1.0)   06/03/18  08:15    


 


Ur Leukocyte Esterase  Negative  (NEGATIVE)   06/03/18  08:15    


 


Urine WBC (Auto)  None /hpf (3-5)   06/03/18  08:15    


 


Urine RBC (Auto)  None /hpf (0-3)   06/03/18  08:15    


 


Ur Epithelial Cells  Rare /HPF (FEW)   06/03/18  08:15    


 


Urine Mucus  Many   06/03/18  08:15    


 


RPR Titer  Nonreactive  (NONREACTIVE)   06/03/18  07:40    








lab noted


Assessment: 





06/06/18 10:16


mild opiate withdrawal sx


right upper tooth trouble on and off x 6 years


blood pressure elevation


Plan: 





medically supervised detox


nystatin


amlodopine

## 2018-06-07 NOTE — DS
[facility] Detox Discharge Summary


Admission Date: 


06/02/18





Discharge Date: 06/07/18





- History


Present History: Opioid Dependence


Additional Comments: 





53 years old male admitted 6/2/18 for opiate withdrawal sx


completed opiate detox regimen tolerated well


patient left the detox facility early today for "work" as per conversation on 06 /06/18


patient will participate in community health services for hypertension oral 

care and addiction recovery





- Physical Exam Results


Vital Signs: 


 Vital Signs











Temperature  98.1 F   06/06/18 23:02


 


Pulse Rate  72   06/06/18 23:02


 


Respiratory Rate  18   06/07/18 03:30


 


Blood Pressure  155/95   06/06/18 23:02


 


O2 Sat by Pulse Oximetry (%)      











Pertinent Admission Physical Exam Findings: 





opiate withdrawal sx


 Vital Signs











Temperature  98.1 F   06/06/18 23:02


 


Pulse Rate  72   06/06/18 23:02


 


Respiratory Rate  18   06/07/18 03:30


 


Blood Pressure  155/95   06/06/18 23:02


 


O2 Sat by Pulse Oximetry (%)      








 Laboratory Last Values











WBC  5.9 K/mm3 (4.0-10.0)   06/03/18  07:40    


 


RBC  4.57 M/mm3 (4.00-5.60)   06/03/18  07:40    


 


Hgb  13.6 GM/dL (11.7-16.9)   06/03/18  07:40    


 


Hct  41.8 % (35.4-49)   06/03/18  07:40    


 


MCV  91.6 fl (80-96)   06/03/18  07:40    


 


MCH  29.9 pg (25.7-33.7)   06/03/18  07:40    


 


MCHC  32.6 g/dl (32.0-35.9)   06/03/18  07:40    


 


RDW  14.2 % (11.9-15.9)   06/03/18  07:40    


 


Plt Count  230 K/MM3 (134-434)   06/03/18  07:40    


 


MPV  9.9 fl (7.5-11.1)   06/03/18  07:40    


 


Sodium  143 mmol/L (136-145)   06/03/18  07:40    


 


Potassium  4.0 mmol/L (3.5-5.1)  D 06/03/18  07:40    


 


Chloride  104 mmol/L ()   06/03/18  07:40    


 


Carbon Dioxide  29 mmol/L (21-32)   06/03/18  07:40    


 


Anion Gap  10  (8-16)   06/03/18  07:40    


 


BUN  12 mg/dL (7-18)   06/03/18  07:40    


 


Creatinine  1.0 mg/dL (0.7-1.3)   06/03/18  07:40    


 


Creat Clearance w eGFR  > 60  (>60)   06/03/18  07:40    


 


Random Glucose  93 mg/dL ()  D 06/03/18  07:40    


 


Calcium  8.6 mg/dL (8.5-10.1)   06/03/18  07:40    


 


Total Bilirubin  0.4 mg/dL (0.2-1.0)  D 06/03/18  07:40    


 


AST  6 U/L (15-37)  L D 06/03/18  07:40    


 


ALT  18 U/L (12-78)   06/03/18  07:40    


 


Alkaline Phosphatase  91 U/L ()  D 06/03/18  07:40    


 


Total Protein  7.4 g/dl (6.4-8.2)   06/03/18  07:40    


 


Albumin  3.8 g/dl (3.4-5.0)   06/03/18  07:40    


 


Urine Color  Chelsea   06/03/18  08:15    


 


Urine Appearance  Turbid   06/03/18  08:15    


 


Urine pH  5.0  (5.0-8.0)   06/03/18  08:15    


 


Ur Specific Gravity  1.027  (1.001-1.035)   06/03/18  08:15    


 


Urine Protein  1+  (NEGATIVE)  H  06/03/18  08:15    


 


Urine Glucose (UA)  Negative  (NEGATIVE)   06/03/18  08:15    


 


Urine Ketones  Negative  (NEGATIVE)   06/03/18  08:15    


 


Urine Blood  Negative  (NEGATIVE)   06/03/18  08:15    


 


Urine Nitrite  Negative  (NEGATIVE)   06/03/18  08:15    


 


Urine Bilirubin  Negative  (<2.0 mg/dL)   06/03/18  08:15    


 


Urine Urobilinogen  Negative mg/dL (0.2-1.0)   06/03/18  08:15    


 


Ur Leukocyte Esterase  Negative  (NEGATIVE)   06/03/18  08:15    


 


Urine WBC (Auto)  None /hpf (3-5)   06/03/18  08:15    


 


Urine RBC (Auto)  None /hpf (0-3)   06/03/18  08:15    


 


Ur Epithelial Cells  Rare /HPF (FEW)   06/03/18  08:15    


 


Urine Mucus  Many   06/03/18  08:15    


 


RPR Titer  Nonreactive  (NONREACTIVE)   06/03/18  07:40    








lab noted





- Treatment


Hospital Course: Detox Protocol Followed, Detoxed Safely, Responded well, 

Discharged Condition Good, Rehab Referral Accepted


Patient has Accepted a [facility] Referral to: [facility]





- Medication


Discharge Medications: 


Ambulatory Orders





Sertraline HCl [Zoloft -] 50 mg PO DAILY #30 tablet 06/03/18 


Amlodipine Besylate [Norvasc -] 5 mg PO DAILY #30 tablet 06/07/18 


Nystatin Oral Suspension - [Nystatin Oral Susp 382331 Units/5 ML -] 500,000 

units PO Q6H #1 cup 06/07/18 











- Diagnosis


(1) Oral hygiene poor


Status: Acute   





(2) Nicotine dependence


Status: Acute   


Qualifiers: 


   Nicotine product type: cigarettes   Substance use status: in withdrawal   

Qualified Code(s): F17.213 - Nicotine dependence, cigarettes, with withdrawal   





(3) Substance induced mood disorder


Status: Suspected   





(4) Weight loss


Status: Acute   





(5) Hypertension


Status: Chronic   


Qualifiers: 


   Hypertension type: essential hypertension   Qualified Code(s): I10 - 

Essential (primary) hypertension   





- AMA


Did Patient Leave Against Medical Advice: No

## 2018-07-08 ENCOUNTER — HOSPITAL ENCOUNTER (INPATIENT)
Dept: HOSPITAL 74 - YASAS | Age: 53
LOS: 5 days | Discharge: OTHER INSTITUTION | DRG: 773 | End: 2018-07-13
Attending: SURGERY | Admitting: SURGERY
Payer: COMMERCIAL

## 2018-07-08 VITALS — BODY MASS INDEX: 24 KG/M2

## 2018-07-08 DIAGNOSIS — Z91.018: ICD-10-CM

## 2018-07-08 DIAGNOSIS — F11.23: Primary | ICD-10-CM

## 2018-07-08 DIAGNOSIS — E78.00: ICD-10-CM

## 2018-07-08 DIAGNOSIS — F12.20: ICD-10-CM

## 2018-07-08 DIAGNOSIS — F17.213: ICD-10-CM

## 2018-07-08 DIAGNOSIS — F19.24: ICD-10-CM

## 2018-07-08 DIAGNOSIS — I10: ICD-10-CM

## 2018-07-08 DIAGNOSIS — F10.230: ICD-10-CM

## 2018-07-08 DIAGNOSIS — I25.2: ICD-10-CM

## 2018-07-08 DIAGNOSIS — F41.8: ICD-10-CM

## 2018-07-08 DIAGNOSIS — Z59.0: ICD-10-CM

## 2018-07-08 LAB
APPEARANCE UR: CLEAR
BILIRUB UR STRIP.AUTO-MCNC: NEGATIVE MG/DL
COLOR UR: (no result)
KETONES UR QL STRIP: NEGATIVE
LEUKOCYTE ESTERASE UR QL STRIP.AUTO: NEGATIVE
NITRITE UR QL STRIP: NEGATIVE
PH UR: 6 [PH] (ref 5–8)
PROT UR QL STRIP: NEGATIVE
PROT UR QL STRIP: NEGATIVE
SP GR UR: 1.01 (ref 1–1.03)
UROBILINOGEN UR STRIP-MCNC: NEGATIVE MG/DL (ref 0.2–1)

## 2018-07-08 PROCEDURE — HZ2ZZZZ DETOXIFICATION SERVICES FOR SUBSTANCE ABUSE TREATMENT: ICD-10-PCS | Performed by: SURGERY

## 2018-07-08 RX ADMIN — Medication SCH MG: at 22:12

## 2018-07-08 RX ADMIN — NICOTINE SCH MG: 21 PATCH TRANSDERMAL at 11:55

## 2018-07-08 SDOH — ECONOMIC STABILITY - HOUSING INSECURITY: HOMELESSNESS: Z59.0

## 2018-07-08 NOTE — HP
COWS





- Scale


Resting Pulse: 0= AR 80 or Below


Sweatin= Chills/Flushing


Restless Observation: 3= Extraneous Movement


Pupil Size: 2= Moderately Dilated


Bone or Joint Aches: 2= Severe Diffuse Aches


Runny Nose/ Eye Tearin= Runny Nose/Eyes


GI Upset > 30mins: 3= Vomiting/Diarrhea


Tremor Observation: 2= Slight Tremor Visible


Yawning Observation: 1= 1-2x During Session


Anxiety or Irritability: 2=Irritable/Anxious


Goose Flesh Skin: 0=Smooth Skin


COWS Score: 18





CIWA Score





- CIWA Score


Nausea/Vomiting: 3


Muscle Tremors: 3


Anxiety: 3


Agitation: 3


Paroxysmal Sweats: 1-Minimal Palms Moist


Orientation: 0-Oriented


Tacttile Disturbances: 1-Very Mild Itch/Numbness


Auditory Disturbances: 1-Very Mild


Visual Disturbances: 0-None


Headache: 2-Mild


CIWA-Ar Total Score: 17





Admission ROS [facility]





- HPI


Chief Complaint: 





i need help to stop using heroin and alcohol


Allergies/Adverse Reactions: 


 Allergies











Allergy/AdvReac Type Severity Reaction Status Date / Time


 


shellfish derived Allergy Severe Swelling Verified 18 11:05


 


tomato Allergy Severe Swelling Verified 18 11:05


 


Fish Containing Products Allergy Mild Rash Verified 18 11:05


 


No Known Drug Allergies Allergy   Verified 18 11:05


 


cheese AdvReac Intermediate Swelling Verified 18 11:05











History of Present Illness: 





this 53 years old male with heroin and alcohol dependence seeking detox,

withdrawal symptom,last detox 18 to 18


syncope last 2017


hypertension and hypercholesterolemia non compliance


multiple admissions in the past but keep relapsing


nicotine dependence


anxiety,depression,insomnia


longest period sobriety 10 years





Exam Limitations: No Limitations





- Ebola screening


Have you traveled outside of the country in the last 21 days: No (N)


Have you had contact with anyone from an Ebola affected area: No


Have you been sick,other than usual withdrawal symptoms: No


Do you have a fever: No





- Review of Systems


Constitutional: Chills, Malaise, Night Sweats, Changes in sleep, Weakness, 

Weight Stable


EENT: reports: Nose Congestion


Respiratory: reports: No Symptoms reported


Cardiac: reports: No Symptoms Reported


GI: reports: Diarrhea, Nausea, Poor Appetite, Vomiting


: reports: No Symptoms Reported


Musculoskeletal: reports: Back Pain, Joint Pain, Muscle Pain


Integumentary: reports: Dryness


Neuro: reports: Headache, Tremors


Endocrine: reports: No Symptoms Reported


Hematology: reports: No Symptoms Reported, Lymph Node Abnormalities


Psychiatric: reports: No Sypmtoms Reported, Mood/Affect Appropiate, Orientated 

x3, Anxious, Depressed





Patient History





- Patient Medical History


Hx Anemia: No


Hx Asthma: No


Hx Chronic Obstructive Pulmonary Disease (COPD): No


Hx Cancer: No


Hx Cardiac Disorders: Yes (old mi at age of 40 admitted in [facility])


Hx Congestive Heart Failure: No


Hx Hypertension: Yes (no medication)


Hx Hypercholesterolemia: Yes (non adherent)


Hx Pacemaker: No


HX Cerebrovascular Accident: No


Hx Seizures: No


Hx Dementia: No


Hx Diabetes: No


Hx Gastrointestinal Disorders: No


Hx Liver Disease: No


Hx Genitourinary Disorders: No


Hx Sexually Transmitted Disorders: No


Hx Renal Disease (ESRD): No


Hx Thyroid Disease: No


Hx Human Immunodeficiency Virus (HIV): No (Last Tested:  18 NEGATIVE)


Hx Hepatitis C: No


Hx Depression: Yes (anxiety)


Hx Suicide Attempt: No (PATIENT DENIES CURRENT SI / HI.)


Hx Bipolar Disorder: No


Hx Schizophrenia: No


Other Medical History: insomnia,no suicidal ,no homicidal





- Patient Surgical History


Past Surgical History: No


Hx Neurologic Surgery: No


Hx Cataract Extraction: No


Hx Cardiac Surgery: No


Hx Lung Surgery: No


Hx Breast Surgery: No


Hx Breast Biopsy: No


Hx Abdominal Surgery: No


Hx Appendectomy: No


Hx Cholecystectomy: No


Hx Genitourinary Surgery: No


Hx  Section: No


Hx Orthopedic Surgery: No


Anesthesia Reaction: No





- PPD History


Previous Implant?: Yes


Documented Results: Negative w/proof


Implanted On Prior [facility] Admission?: Yes


Date: 18


Results: 0MM


PPD to be Administered?: No





- Smoking Cessation


Smoking history: Current every day smoker


Have you smoked in the past 12 months: Yes


Aproximately how many cigarettes per day: 20


Cigars Per Day: 0


Hx Chewing Tobacco Use: No


Initiated information on smoking cessation: Yes


'Breaking Loose' booklet given: 18





- Substance & Tx. History


Hx Alcohol Use: Yes


Hx Substance Use: Yes


Substance Use Type: Alcohol, Heroin, Marijuana


Hx Substance Use Treatment: Yes ([facility] 18)





- Substances Abused


  ** Heroin


Route: Inhalation


Frequency: Daily


Amount used: 10 bags


Age of first use: 15


Date of Last Use: 18





  ** Alcohol


Route: Oral


Frequency: 3-6 times per week


Amount used: 3 of 40 ozs of beer


Age of first use: 15


Date of Last Use: 18





  ** Marijuana/Hashish


Route: Smoking


Frequency: 1-2 times per week


Amount used: 10$


Age of first use: 11


Date of Last Use: 18





  ** street methadone


Route: Oral


Frequency: 1-3 times last 30 days


Amount used: 15 mgs


Age of first use: 29


Date of Last Use: 18





Family Disease History





- Family Disease History


Family Disease History: Heart Disease: Father (MI,), Mother (ETOH 

DEPENDENT AND ,MI), Brother (MI; Bipolar Disorder.), Sister (MI), Other: 

Mother, Brother, Son (2 sons - one  in MVA, one son with Downs), 

Daughter (one daughter murdered (thrown out window))





Admission Physical Exam [facility]





- Vital Signs


Vital Signs: 


 Vital Signs - 24 hr











  18





  09:33


 


Temperature 99.1 F


 


Pulse Rate 76


 


Respiratory 18





Rate 


 


Blood Pressure 118/73














- Physical


General Appearance: Yes: Moderate Distress, Thin, Tremorous, Irritable, Sweating


HEENTM: Yes: Pharynx Normal, Nasal Congestion


Respiratory: Yes: Within Normal Limits, Lungs Clear


Neck: Yes: Within Normal Limits, Supple, Trachea in good position


Breast: Yes: Within Normal Limits


Cardiology: Yes: Within Normal Limits, Regular Rhythm, S1, S2


Abdominal: Yes: Within Normal Limits, Normal Bowel Sounds, Non Tender, Soft


Genitourinary: Yes: Within Normal Limits


Back: Yes: Within Normal Limits, Normal Inspection, Muscle Spasm


Musculoskeletal: Yes: Within Normal Limits, full range of Motion, Back pain


Extremities: Yes: Normal Inspection, Tremors


Neurological: Yes: CNs II-XII NML intact, Fully Oriented, Alert, Motor Strength 

5/5, Normal Mood/Affect


Integumentary: Yes: Dry


Lymphatic: Yes: Within Normal Limits





- Diagnostic


(1) Opioid dependence with withdrawal


Current Visit: No   Status: Acute   





(2) Alcohol dependence with uncomplicated withdrawal


Current Visit: No   Status: Acute   





(3) Nicotine dependence


Current Visit: No   Status: Acute   


Qualifiers: 


   Nicotine product type: cigarettes   Substance use status: in withdrawal   

Qualified Code(s): F17.213 - Nicotine dependence, cigarettes, with withdrawal   





(4) Cannabis dependence


Current Visit: No   Status: Chronic   





(5) Hypercholesterolemia


Current Visit: No   Status: Chronic   





(6) Hypertension


Current Visit: No   Status: Chronic   


Qualifiers: 


   Hypertension type: essential hypertension   Qualified Code(s): I10 - 

Essential (primary) hypertension   





(7) History of MI (myocardial infarction)


Current Visit: No   Status: Resolved   





Cleared for Admission S





- Detox or Rehab


[facility] Level of Care: Medically Managed


Detox Regimen/Protocol: Methadone/Valium





[facility] Breath Alcohol Content


Breath Alcohol Content: 0





Urine Drug Screen





- Results


Drug Screen Negative: No


Urine Drug Screen Results: THC-Marijuana, MTD-Methadone

## 2018-07-09 LAB
ALBUMIN SERPL-MCNC: 3.2 G/DL (ref 3.4–5)
ALP SERPL-CCNC: 65 U/L (ref 45–117)
ALT SERPL-CCNC: 19 U/L (ref 12–78)
ANION GAP SERPL CALC-SCNC: 5 MMOL/L (ref 8–16)
AST SERPL-CCNC: 10 U/L (ref 15–37)
BILIRUB SERPL-MCNC: 0.3 MG/DL (ref 0.2–1)
BUN SERPL-MCNC: 11 MG/DL (ref 7–18)
CALCIUM SERPL-MCNC: 8.2 MG/DL (ref 8.5–10.1)
CHLORIDE SERPL-SCNC: 106 MMOL/L (ref 98–107)
CO2 SERPL-SCNC: 32 MMOL/L (ref 21–32)
CREAT SERPL-MCNC: 0.9 MG/DL (ref 0.7–1.3)
DEPRECATED RDW RBC AUTO: 13.9 % (ref 11.9–15.9)
GLUCOSE SERPL-MCNC: 82 MG/DL (ref 74–106)
HCT VFR BLD CALC: 39.2 % (ref 35.4–49)
HGB BLD-MCNC: 12.9 GM/DL (ref 11.7–16.9)
MCH RBC QN AUTO: 29.2 PG (ref 25.7–33.7)
MCHC RBC AUTO-ENTMCNC: 32.9 G/DL (ref 32–35.9)
MCV RBC: 88.8 FL (ref 80–96)
PLATELET # BLD AUTO: 219 K/MM3 (ref 134–434)
PMV BLD: 10 FL (ref 7.5–11.1)
POTASSIUM SERPLBLD-SCNC: 4 MMOL/L (ref 3.5–5.1)
PROT SERPL-MCNC: 6.2 G/DL (ref 6.4–8.2)
RBC # BLD AUTO: 4.41 M/MM3 (ref 4–5.6)
SODIUM SERPL-SCNC: 143 MMOL/L (ref 136–145)
WBC # BLD AUTO: 11.3 K/MM3 (ref 4–10)

## 2018-07-09 RX ADMIN — Medication SCH TAB: at 10:28

## 2018-07-09 RX ADMIN — NICOTINE SCH: 21 PATCH TRANSDERMAL at 10:35

## 2018-07-09 RX ADMIN — Medication SCH MG: at 22:10

## 2018-07-09 NOTE — EKG
Test Reason : 

Blood Pressure : ***/*** mmHG

Vent. Rate : 071 BPM     Atrial Rate : 071 BPM

   P-R Int : 158 ms          QRS Dur : 084 ms

    QT Int : 404 ms       P-R-T Axes : -02 083 037 degrees

   QTc Int : 439 ms

 

NORMAL SINUS RHYTHM

SEPTAL INFARCT , AGE UNDETERMINED

ABNORMAL ECG

WHEN COMPARED WITH ECG OF 02-JUN-2018 18:08,

T WAVE VARIATION

Confirmed by KAREEM ECHOLS MD ([id number]) on 7/9/2018 3:08:51 PM

 

Referred By:             Confirmed By:KAREEM ECHOLS MD

## 2018-07-09 NOTE — CONSULT
[facility] Psychiatric Consult





- Data


Date of interview: 18


Admission source: [facility]


Identifying data: Readmission to [facility] for this 52 y/o  male 

seeking detox treatment [address] for alcohol and heroin dependence.Patient is 

single,a father of four (two dependents are ),homeless,unemployed and 

supported on food stamps.


Substance Abuse History: Confirmed by patient in this encounter.Smoking history

: Current every day smoker.  Have you smoked in the past 12 months: Yes.  

Aproximately how many cigarettes per day: 20.  Cigars Per Day: 0.  Hx Chewing 

Tobacco Use: No.  Initiated information on smoking cessation: Yes.  'Breaking 

Loose' booklet given: 18.  - Substance & Tx. History.  Hx Alcohol Use: 

Yes.  Hx Substance Use: Yes.  Substance Use Type: Alcohol, Heroin, Marijuana.  

Hx Substance Use Treatment: Yes ([facility] 18to 18).  - Substances 

Abused.  ** Heroin.  Route: Inhalation.  Frequency: Daily.  Amount used: 10 

bags.  Age of first use: 15.  Date of Last Use: 18.  ** Alcohol.  Route: 

Oral.  Frequency: 3-6 times per week.  Amount used: 3 of 40 ozs of beer.  Age 

of first use: 15.  Date of Last Use: 18.  ** Marijuana/Hashish.  Route: 

Smoking.  Frequency: 1-2 times per week.  Amount used: 10$.  Age of first use: 

11.  Date of Last Use: 18.  ** street methadone.  Route: Oral.  Frequency

: 1-3 times last 30 days.  Amount used: 15 mgs.  Age of first use: 29.  Date of 

Last Use: 18


Medical History: Distant history of myocardial infarction and current treatment 

for hypertension and dyslipidemia.


Psychiatric History: Patient presents with a history of multiple psychiatric 

hospitalizations ([facility]).Onset of psychiatric disturbances : age 18. Diagnosed with 

MDD.Mr Garcia reports psychiatric follow up for eight consecutive years at an 

OPD program, [facility], until he dropped out of 

treatment.NOT on psychotropic medications.Totally lost to follow up.No reported 

history of suicide attempts.


Physical/Sexual Abuse/Trauma History: Traumatized by several personal losses (

death of two dependents),chronic status of homelessness,lack of support and 

serious financial difficulties.


Additional Comment: Urine Drug Screen Results: THC-Marijuana, MTD-

Methadone.Noted.





Mental Status Exam





- Mental Status Exam


Alert and Oriented to: Time, Place, Person


Cognitive Function: Good


Patient Appearance: Well Groomed


Mood: Withdrawn, Hopeful


Affect: Mood Congruent, Constricted


Patient Behavior: Appropriate, Cooperative


Speech Pattern: Clear, Appropriate


Voice Loudness: Normal


Thought Process: Intact, Goal Oriented


Thought Disorder: Not Present


Hallucinations: Denies


Suicidal Ideation: Denies


Homicidal Ideation: Denies


Insight/Judgement: Poor


Sleep: Well


Appetite: Good


Muscle strength/Tone: Normal


Gait/Station: Normal





Psychiatric Findings





- Problem List (Axis 1, 2,3)


(1) Opioid dependence with withdrawal


Current Visit: Yes   Status: Acute   





(2) Alcohol dependence with uncomplicated withdrawal


Current Visit: Yes   Status: Acute   





(3) Cannabis dependence


Current Visit: Yes   Status: Acute   





(4) Nicotine dependence


Current Visit: Yes   Status: Acute   


Qualifiers: 


   Nicotine product type: cigarettes   Substance use status: in withdrawal   

Qualified Code(s): F17.213 - Nicotine dependence, cigarettes, with withdrawal   





(5) Substance induced mood disorder


Current Visit: Yes   Status: Acute   





- Initial Treatment Plan


Initial Treatment Plan: Psychoeducation.Sleep 

hygiene.Detoxification.Observation.

## 2018-07-10 RX ADMIN — METHADONE HYDROCHLORIDE SCH MG: 5 TABLET ORAL at 10:32

## 2018-07-10 RX ADMIN — Medication SCH TAB: at 10:31

## 2018-07-10 RX ADMIN — NICOTINE SCH: 21 PATCH TRANSDERMAL at 10:32

## 2018-07-10 RX ADMIN — Medication SCH MG: at 22:30

## 2018-07-11 RX ADMIN — NICOTINE SCH: 21 PATCH TRANSDERMAL at 10:29

## 2018-07-11 RX ADMIN — METHADONE HYDROCHLORIDE SCH MG: 5 TABLET ORAL at 10:29

## 2018-07-11 RX ADMIN — Medication SCH MG: at 22:07

## 2018-07-11 RX ADMIN — Medication SCH TAB: at 10:29

## 2018-07-12 RX ADMIN — Medication SCH TAB: at 10:53

## 2018-07-12 RX ADMIN — Medication SCH: at 22:34

## 2018-07-12 RX ADMIN — NICOTINE SCH: 21 PATCH TRANSDERMAL at 10:53

## 2018-07-13 VITALS — TEMPERATURE: 97.1 F | SYSTOLIC BLOOD PRESSURE: 142 MMHG | HEART RATE: 47 BPM | DIASTOLIC BLOOD PRESSURE: 89 MMHG

## 2018-07-13 RX ADMIN — NICOTINE SCH: 21 PATCH TRANSDERMAL at 10:35

## 2018-07-13 RX ADMIN — Medication SCH TAB: at 10:35

## 2018-07-13 NOTE — DS
[facility] Detox Discharge Summary


Admission Date: 


07/08/18





Discharge Date: 07/13/18





- History


Present History: Alcohol Dependence, Opioid Dependence


Additional Comments: 





DETOX COMPLETED. ALERT O X 3. NAD. 


Pertinent Past History: 





PLEASE SEE DX BELOW





- Physical Exam Results


Vital Signs: 


 Vital Signs











Temperature  97.1 F L  07/13/18 06:33


 


Pulse Rate  47 L  07/13/18 06:33


 


Respiratory Rate  18   07/13/18 06:33


 


Blood Pressure  142/89   07/13/18 06:33


 


O2 Sat by Pulse Oximetry (%)      











Pertinent Admission Physical Exam Findings: 





WITHDRAWAL SX


 Laboratory Tests











  07/08/18 07/09/18 07/09/18





  11:38 06:30 06:30


 


WBC   11.3 H 


 


RBC   4.41 


 


Hgb   12.9 


 


Hct   39.2 


 


MCV   88.8 


 


MCH   29.2 


 


MCHC   32.9 


 


RDW   13.9 


 


Plt Count   219 


 


MPV   10.0 


 


Sodium    143


 


Potassium    4.0


 


Chloride    106


 


Carbon Dioxide    32


 


Anion Gap    5 L


 


BUN    11


 


Creatinine    0.9


 


Creat Clearance w eGFR    > 60


 


Random Glucose    82


 


Calcium    8.2 L


 


Total Bilirubin    0.3


 


AST    10 L D


 


ALT    19


 


Alkaline Phosphatase    65


 


Total Protein    6.2 L


 


Albumin    3.2 L


 


Urine Color  Ltyellow  


 


Urine Appearance  Clear  


 


Urine pH  6.0  


 


Ur Specific [place]  1.011  


 


Urine Protein  Negative  


 


Urine Glucose (UA)  Negative  


 


Urine Ketones  Negative  


 


Urine Blood  Negative  


 


Urine Nitrite  Negative  


 


Urine Bilirubin  Negative  


 


Urine Urobilinogen  Negative  


 


Ur Leukocyte Esterase  Negative  


 


RPR Titer   


 


HIV 1&2 Antibody Screen   


 


HIV P24 Antigen   














  07/09/18 07/09/18





  06:30 06:30


 


WBC  


 


RBC  


 


Hgb  


 


Hct  


 


MCV  


 


MCH  


 


MCHC  


 


RDW  


 


Plt Count  


 


MPV  


 


Sodium  


 


Potassium  


 


Chloride  


 


Carbon Dioxide  


 


Anion Gap  


 


BUN  


 


Creatinine  


 


Creat Clearance w eGFR  


 


Random Glucose  


 


Calcium  


 


Total Bilirubin  


 


AST  


 


ALT  


 


Alkaline Phosphatase  


 


Total Protein  


 


Albumin  


 


Urine Color  


 


Urine Appearance  


 


Urine pH  


 


Ur Specific Gravity  


 


Urine Protein  


 


Urine Glucose (UA)  


 


Urine Ketones  


 


Urine Blood  


 


Urine Nitrite  


 


Urine Bilirubin  


 


Urine Urobilinogen  


 


Ur Leukocyte Esterase  


 


RPR Titer  Nonreactive 


 


HIV 1&2 Antibody Screen   Negative


 


HIV P24 Antigen   Negative














- Treatment


Hospital Course: Detox Protocol Followed, Detoxed Safely, Responded well, 

Discharged Condition Good, Rehab Referral Accepted


Patient has Accepted a Rehab Referral to: [facility]





- [facility]


Discharge Medications: 


Ambulatory Orders





Amlodipine Besylate [Norvasc -] 5 mg PO DAILY #30 tablet 06/07/18 


Atorvastatin Calcium [Lipitor] 20 mg PO DAILY 07/08/18 











- Diagnosis


(1) Alcohol dependence with uncomplicated withdrawal


Status: Acute   





(2) Opioid dependence with withdrawal


Status: Acute   





(3) History of hypercholesterolemia


Status: Suspected   





(4) History of MI (myocardial infarction)


Status: Resolved   





(5) Nicotine dependence


Status: Acute   


Qualifiers: 


   Nicotine product type: cigarettes   Substance use status: in withdrawal   

Qualified Code(s): F17.213 - Nicotine dependence, cigarettes, with withdrawal   





- AMA


Did Patient Leave Against Medical Advice: No

## 2018-10-27 ENCOUNTER — HOSPITAL ENCOUNTER (INPATIENT)
Dept: HOSPITAL 74 - YASAS | Age: 53
LOS: 2 days | Discharge: LEFT BEFORE DISCHARGE | DRG: 770 | End: 2018-10-29
Attending: INTERNAL MEDICINE | Admitting: INTERNAL MEDICINE
Payer: COMMERCIAL

## 2018-10-27 VITALS — BODY MASS INDEX: 20.3 KG/M2

## 2018-10-27 DIAGNOSIS — F11.23: Primary | ICD-10-CM

## 2018-10-27 DIAGNOSIS — Z91.011: ICD-10-CM

## 2018-10-27 DIAGNOSIS — Z86.69: ICD-10-CM

## 2018-10-27 DIAGNOSIS — E78.00: ICD-10-CM

## 2018-10-27 DIAGNOSIS — F39: ICD-10-CM

## 2018-10-27 DIAGNOSIS — Z87.898: ICD-10-CM

## 2018-10-27 DIAGNOSIS — F12.20: ICD-10-CM

## 2018-10-27 DIAGNOSIS — F10.20: ICD-10-CM

## 2018-10-27 DIAGNOSIS — I10: ICD-10-CM

## 2018-10-27 DIAGNOSIS — F17.210: ICD-10-CM

## 2018-10-27 DIAGNOSIS — F41.8: ICD-10-CM

## 2018-10-27 DIAGNOSIS — Z59.0: ICD-10-CM

## 2018-10-27 DIAGNOSIS — R00.1: ICD-10-CM

## 2018-10-27 DIAGNOSIS — Z91.013: ICD-10-CM

## 2018-10-27 DIAGNOSIS — I25.2: ICD-10-CM

## 2018-10-27 DIAGNOSIS — Z91.14: ICD-10-CM

## 2018-10-27 SDOH — ECONOMIC STABILITY - HOUSING INSECURITY: HOMELESSNESS: Z59.0

## 2018-10-27 NOTE — HP
COWS





- Scale


Resting Pulse: 0= [place] 80 or Below


Sweatin=Flushed/Facial Moisture


Restless Observation: 1= Difficult to Sit Still


Pupil Size: 1= Pupils >than Normal


Bone or Joint Aches: 2= Severe Diffuse Aches


Runny Nose/ Eye Tearin= Runny Nose/Eyes


GI Upset > 30mins: 2= Nausea/Diarrhea


Tremor Observation: 2= Slight Tremor Visible


Yawning Observation: 1= 1-2x During Session


Anxiety or Irritability: 1=Feels Anxious/Irritable


Goose Flesh Skin: 0=Smooth Skin


COWS Score: 14





Admission ROS [facility]





- HPI


Chief Complaint: 





DEPENDENT ON HEROIN AND MARIJUANA


Allergies/Adverse Reactions: 


 Allergies











Allergy/AdvReac Type Severity Reaction Status Date / Time


 


shellfish derived Allergy Severe Swelling Verified 18 11:05


 


tomato Allergy Severe Swelling Verified 18 11:05


 


Fish Containing Products Allergy Mild Rash Verified 18 11:05


 


No Known Drug Allergies Allergy   Verified 18 11:05


 


cheese AdvReac Intermediate Swelling Verified 18 11:05











History of Present Illness: 





THE PT. IS REQUESTING ADMISSION TO THE DETOX UNIT AND CAME FOR H AND PE


Exam Limitations: No Limitations





- Ebola screening


Have you traveled outside of the country in the last 21 days: No (N)


Have you had contact with anyone from an Ebola affected area: No


Have you been sick,other than usual withdrawal symptoms: No


Do you have a fever: No





- Review of Systems


Constitutional: See HPI, Loss of Appetite, Malaise, Weakness, Unexplained wgt 

Loss


EENT: reports: See HPI


Respiratory: reports: See HPI


Cardiac: reports: See HPI


GI: reports: See HPI, Nausea, Poor Appetite, Poor Fluid Intake, Vomiting, 

Abdominal cramping


: reports: No Symptoms Reported, See HPI


Musculoskeletal: reports: See HPI, Muscle Pain, Muscle Weakness


Integumentary: reports: See HPI, Sweating


Neuro: reports: See HPI, Headache, Tremors, Weakness


Endocrine: reports: See HPI


Hematology: reports: See HPI


Psychiatric: reports: Judgement Intact, Anxious, Depressed


Other Systems: Reviewed and Negative





Patient History





- Patient Medical History


Hx Anemia: No


Hx Asthma: No


Hx Chronic Obstructive Pulmonary Disease (COPD): No


Hx Cancer: No


Hx Cardiac Disorders: Yes (old MI at age 40)


Hx Congestive Heart Failure: No


Hx Hypertension: Yes


Hx Hypercholesterolemia: Yes (non adherent)


Hx Pacemaker: No


HX Cerebrovascular Accident: No


Hx Seizures: Yes (drug r/t seizure last attack 2 months ago)


Hx Dementia: No


Hx Diabetes: No


Hx Gastrointestinal Disorders: No


Hx Liver Disease: No


Hx Genitourinary Disorders: No


Hx Sexually Transmitted Disorders: No


Hx Renal Disease (ESRD): No


Hx Thyroid Disease: No


Hx Human Immunodeficiency Virus (HIV): No (Last Tested:  18 NEGATIVE)


Hx Hepatitis C: No


Hx Depression: Yes (AND ANXIETY)


Hx Suicide Attempt: No


Hx Bipolar Disorder: No


Hx Schizophrenia: No





- Patient Surgical History


Past Surgical History: No


Hx Neurologic Surgery: No


Hx Cataract Extraction: No


Hx Cardiac Surgery: No


Hx Lung Surgery: No


Hx Breast Surgery: No


Hx Breast Biopsy: No


Hx Abdominal Surgery: No


Hx Appendectomy: No


Hx Cholecystectomy: No


Hx Genitourinary Surgery: No


Hx  Section: No


Hx Orthopedic Surgery: No


Anesthesia Reaction: No





- PPD History


Previous Implant?: Yes


Documented Results: Negative w/proof


Date: 18


Results: 0 mm





- Smoking Cessation


Smoking history: Current every day smoker


Have you smoked in the past 12 months: Yes


Aproximately how many cigarettes per day: 20


Cigars Per Day: 0


Hx Chewing Tobacco Use: No


Initiated information on smoking cessation: Yes


'Breaking Loose' booklet given: 10/27/18





- Substance & Tx. History


Hx Alcohol Use: No


Hx Substance Use: Yes


Substance Use Type: Heroin, Marijuana





- Substances Abused


  ** Heroin


Route: Inhalation


Frequency: Daily


Amount used: 10 bags


Age of first use: 13


Date of Last Use: 10/26/18





  ** Marijuana/Hashish


Route: Smoking


Frequency: Daily


Amount used: 1 bag


Age of first use: 15


Date of Last Use: 10/27/18





Family Disease History





- Family Disease History


Family Disease History: Heart Disease: Father (MI,), Mother (ETOH 

DEPENDENT AND ,MI), Brother (MI; Bipolar Disorder.), Sister (MI), Other: 

Mother, Brother, Son (2 sons - one  in MVA, one son with Downs), 

Daughter (one daughter murdered (thrown out window))





Admission Physical Exam [facility]





- Vital Signs


Vital Signs: 


 Vital Signs - 24 hr











  10/27/18





  18:54


 


Temperature 98.9 F


 


Pulse Rate 58 L


 


Respiratory 18





Rate 


 


Blood Pressure 135/78














- Physical


General Appearance: Yes: No Apparent Distress, Appropriately Dressed, Alcohol 

on Breath, Sweating, Anxious


HEENTM: Yes: Hearing grossly Normal, Normocephalic, Normal Voice, LASHA, Pharynx 

Normal


Respiratory: Yes: Chest Non-Tender, Lungs Clear, Normal Breath Sounds, No 

Respiratory Distress, No Accessory Muscle Use


Neck: Yes: No masses,lesions,Nodules, Supple, Trachea in good position


Breast: Yes: Breast Exam Deferred, Axillae without masses


Cardiology: Yes: Regular Rhythm, S1, S2, Bradycardia


Abdominal: Yes: Normal Bowel Sounds, Non Tender, Flat, Soft


Back: Yes: Normal Inspection


Musculoskeletal: Yes: full range of Motion, Gait Steady, Pelvis Stable, Muscle 

Pain, Muscle weakness


Extremities: Yes: Normal Capillary Refill, Normal Range of Motion, Non-Tender, 

Tremors


Neurological: Yes: CNs II-XII NML intact, Fully Oriented, Alert, Motor Strength 

5/5, Normal Response, Depressed Affect


Integumentary: Yes: Normal Color, Warm, Moist


Lymphatic: Yes: Within Normal Limits





- Diagnostic


(1) Cannabis dependence


Current Visit: Yes   Status: Chronic   





(2) Nicotine dependence


Current Visit: Yes   Status: Chronic   


Qualifiers: 


   Nicotine product type: cigarettes   Substance use status: uncomplicated   

Qualified Code(s): F17.210 - Nicotine dependence, cigarettes, uncomplicated   





(3) Opioid dependence


Current Visit: Yes   Status: Chronic   





(4) Anxiety and depression


Current Visit: Yes   Status: Chronic   





(5) Hypercholesterolemia


Current Visit: Yes   Status: Chronic   





(6) Hypertension


Current Visit: Yes   Status: Chronic   


Qualifiers: 


   Hypertension type: essential hypertension   Qualified Code(s): I10 - 

Essential (primary) hypertension   





Cleared for Admission [facility]





- Detox or Rehab


[facility] Level of Care: Medically Supervised


Detox Regimen/Protocol: Methadone





[facility] Breath Alcohol Content


Breath Alcohol Content: 0.002





Urine Drug Screen





- Results


Drug Screen Negative: No


Urine Drug Screen Results: THC-Marijuana, OPI-Opiates, OXY-Oxycodone, FEN-

Fentanyl

## 2018-10-28 LAB
ALBUMIN SERPL-MCNC: 3 G/DL (ref 3.4–5)
ALP SERPL-CCNC: 71 U/L (ref 45–117)
ALT SERPL-CCNC: 14 U/L (ref 13–61)
ANION GAP SERPL CALC-SCNC: 8 MMOL/L (ref 8–16)
APPEARANCE UR: CLEAR
AST SERPL-CCNC: 5 U/L (ref 15–37)
BILIRUB SERPL-MCNC: 0.2 MG/DL (ref 0.2–1)
BILIRUB UR STRIP.AUTO-MCNC: NEGATIVE MG/DL
BUN SERPL-MCNC: 11 MG/DL (ref 7–18)
CALCIUM SERPL-MCNC: 8.2 MG/DL (ref 8.5–10.1)
CHLORIDE SERPL-SCNC: 111 MMOL/L (ref 98–107)
CO2 SERPL-SCNC: 27 MMOL/L (ref 21–32)
COLOR UR: YELLOW
CREAT SERPL-MCNC: 0.6 MG/DL (ref 0.55–1.3)
DEPRECATED RDW RBC AUTO: 13.1 % (ref 11.9–15.9)
GLUCOSE SERPL-MCNC: 85 MG/DL (ref 74–106)
HCT VFR BLD CALC: 40.7 % (ref 35.4–49)
HGB BLD-MCNC: 12.9 GM/DL (ref 11.7–16.9)
KETONES UR QL STRIP: NEGATIVE
LEUKOCYTE ESTERASE UR QL STRIP.AUTO: NEGATIVE
MCH RBC QN AUTO: 28.8 PG (ref 25.7–33.7)
MCHC RBC AUTO-ENTMCNC: 31.7 G/DL (ref 32–35.9)
MCV RBC: 90.9 FL (ref 80–96)
NITRITE UR QL STRIP: NEGATIVE
PH UR: 6 [PH] (ref 5–8)
PLATELET # BLD AUTO: 214 K/MM3 (ref 134–434)
PMV BLD: 10 FL (ref 7.5–11.1)
POTASSIUM SERPLBLD-SCNC: 4 MMOL/L (ref 3.5–5.1)
PROT SERPL-MCNC: 5.9 G/DL (ref 6.4–8.2)
PROT UR QL STRIP: NEGATIVE
PROT UR QL STRIP: NEGATIVE
RBC # BLD AUTO: 4.48 M/MM3 (ref 4–5.6)
SODIUM SERPL-SCNC: 147 MMOL/L (ref 136–145)
SP GR UR: 1.03 (ref 1.01–1.03)
UROBILINOGEN UR STRIP-MCNC: 2 MG/DL (ref 0.2–1)
WBC # BLD AUTO: 6.3 K/MM3 (ref 4–10)
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
## 2018-10-28 NOTE — PN
[facility] COWS





- Scale


Resting Pulse: 0= [place] 80 or Below


Sweatin= Chills/Flushing


Restless Observation: 1= Difficult to Sit Still


Pupil Size: 1= Pupils >than Normal


Bone or Joint Aches: 2= Severe Diffuse Aches


Runny Nose/ Eye Tearin= Nasal Congestion


GI Upset > 30mins: 1= Stomach Cramp


Tremor Observation of Outstretched Hands: 1= Tremor Felt, Not Seen


Yawning Observation: 2= >3x During Session


Anxiety or Irritability: 2=Irritable/Anxious


Goose Flesh Skin: 0=Smooth Skin


COWS Score: 12





[facility] Progress Note (SOAP)


Subjective: 





muscle cramp joints pain body aches anxiety tremor sweat


Objective: 





10/28/18 12:56


 Vital Signs











Temperature  98.2 F   10/28/18 09:49


 


Pulse Rate  53 L  10/28/18 09:49


 


Respiratory Rate  16   10/28/18 09:49


 


Blood Pressure  141/76   10/28/18 09:49


 


O2 Sat by Pulse Oximetry (%)      








 Laboratory Last Values











WBC  6.3 K/mm3 (4.0-10.0)   10/28/18  07:35    


 


RBC  4.48 M/mm3 (4.00-5.60)   10/28/18  07:35    


 


Hgb  12.9 GM/dL (11.7-16.9)   10/28/18  07:35    


 


Hct  40.7 % (35.4-49)   10/28/18  07:35    


 


MCV  90.9 fl (80-96)   10/28/18  07:35    


 


MCH  28.8 pg (25.7-33.7)   10/28/18  07:35    


 


MCHC  31.7 g/dl (32.0-35.9)  L  10/28/18  07:35    


 


RDW  13.1 % (11.9-15.9)   10/28/18  07:35    


 


Plt Count  214 K/MM3 (134-434)   10/28/18  07:35    


 


MPV  10.0 fl (7.5-11.1)   10/28/18  07:35    


 


Sodium  147 mmol/L (136-145)  H  10/28/18  07:35    


 


Potassium  4.0 mmol/L (3.5-5.1)   10/28/18  07:35    


 


Chloride  111 mmol/L ()  H  10/28/18  07:35    


 


Carbon Dioxide  27 mmol/L (21-32)   10/28/18  07:35    


 


Anion Gap  8 MMOL/L (8-16)   10/28/18  07:35    


 


BUN  11 mg/dL (7-18)   10/28/18  07:35    


 


Creatinine  0.6 mg/dL (0.55-1.3)   10/28/18  07:35    


 


Creat Clearance w eGFR  > 60  (>60)   10/28/18  07:35    


 


Random Glucose  85 mg/dL ()   10/28/18  07:35    


 


Calcium  8.2 mg/dL (8.5-10.1)  L  10/28/18  07:35    


 


Total Bilirubin  0.2 mg/dL (0.2-1)   10/28/18  07:35    


 


AST  5 U/L (15-37)  L  10/28/18  07:35    


 


ALT  14 U/L (13-61)   10/28/18  07:35    


 


Alkaline Phosphatase  71 U/L ()   10/28/18  07:35    


 


Total Protein  5.9 g/dl (6.4-8.2)  L  10/28/18  07:35    


 


Albumin  3.0 g/dl (3.4-5.0)  L  10/28/18  07:35    


 


Urine Color  Yellow   10/28/18  08:30    


 


Urine Appearance  Clear   10/28/18  08:30    


 


Urine pH  6.0  (5.0-8.0)   10/28/18  08:30    


 


Ur Specific Gravity  1.027  (1.010-1.035)   10/28/18  08:30    


 


Urine Protein  Negative  (NEGATIVE)   10/28/18  08:30    


 


Urine Glucose (UA)  Negative  (NEGATIVE)   10/28/18  08:30    


 


Urine Ketones  Negative  (NEGATIVE)   10/28/18  08:30    


 


Urine Blood  Negative  (NEGATIVE)   10/28/18  08:30    


 


Urine Nitrite  Negative  (NEGATIVE)   10/28/18  08:30    


 


Urine Bilirubin  Negative  (<2.0 mg/dL)   10/28/18  08:30    


 


Urine Urobilinogen  2.0 mg/dL (0.2-1.0)   10/28/18  08:30    


 


Ur Leukocyte Esterase  Negative  (NEGATIVE)   10/28/18  08:30    


 


RPR Titer  Nonreactive  (NONREACTIVE)   10/28/18  07:35    


 


HIV 1&2 Antibody Screen  Negative   10/28/18  07:35    


 


HIV P24 Antigen  Negative   10/28/18  07:35    








lab noted


calcium rich food


Assessment: 





10/28/18 12:57


withdrawal sx 


Plan: 





continue detox

## 2018-10-28 NOTE — EKG
Test Reason : 

Blood Pressure : ***/*** mmHG

Vent. Rate : 065 BPM     Atrial Rate : 065 BPM

   P-R Int : 122 ms          QRS Dur : 084 ms

    QT Int : 408 ms       P-R-T Axes : -16 083 047 degrees

   QTc Int : 424 ms

 

NORMAL SINUS RHYTHM

NORMAL ECG

WHEN COMPARED WITH ECG OF 08-JUL-2018 11:37,

CRITERIA FOR SEPTAL INFARCT ARE NO LONGER PRESENT

Confirmed by MD Agustin, All ([id number]) on 10/28/2018 1:49:12 PM

 

Referred By:             Confirmed By:All Velez MD

## 2018-10-29 VITALS — SYSTOLIC BLOOD PRESSURE: 135 MMHG | TEMPERATURE: 96.8 F | DIASTOLIC BLOOD PRESSURE: 87 MMHG | HEART RATE: 67 BPM

## 2018-10-29 PROCEDURE — HZ2ZZZZ DETOXIFICATION SERVICES FOR SUBSTANCE ABUSE TREATMENT: ICD-10-PCS | Performed by: INTERNAL MEDICINE

## 2018-10-29 NOTE — DS
[facility] Detox Discharge Summary


Admission Date: 


10/27/18





Discharge Date: 10/29/18





- History


Present History: Opioid Dependence


Additional Comments: 





53 years old male admitted on 10/27/18 for opiate withdrawal sx


had disagreement with the nurse during the medication time


 patient is verbally agressive threatening the nurse "crack her head"


patient wants to leave the facility that other places are better 


patient was showered and refuses lunch 


wants to leave immediately


patient is alert oriented x 3 no acute distress denies suicidal denies 

homocidal no self destructive behavior








- Physical Exam Results


Vital Signs: 


 Vital Signs











Temperature  96.8 F L  10/29/18 10:23


 


Pulse Rate  67   10/29/18 10:23


 


Respiratory Rate  18   10/29/18 10:23


 


Blood Pressure  135/87   10/29/18 10:23


 


O2 Sat by Pulse Oximetry (%)      











Pertinent Admission Physical Exam Findings: 





opiate withdrawal sx


 Vital Signs











Temperature  96.8 F L  10/29/18 10:23


 


Pulse Rate  67   10/29/18 10:23


 


Respiratory Rate  18   10/29/18 10:23


 


Blood Pressure  135/87   10/29/18 10:23


 


O2 Sat by Pulse Oximetry (%)      








 Laboratory Last Values











WBC  6.3 K/mm3 (4.0-10.0)   10/28/18  07:35    


 


RBC  4.48 M/mm3 (4.00-5.60)   10/28/18  07:35    


 


Hgb  12.9 GM/dL (11.7-16.9)   10/28/18  07:35    


 


Hct  40.7 % (35.4-49)   10/28/18  07:35    


 


MCV  90.9 fl (80-96)   10/28/18  07:35    


 


MCH  28.8 pg (25.7-33.7)   10/28/18  07:35    


 


MCHC  31.7 g/dl (32.0-35.9)  L  10/28/18  07:35    


 


RDW  13.1 % (11.9-15.9)   10/28/18  07:35    


 


Plt Count  214 K/MM3 (134-434)   10/28/18  07:35    


 


MPV  10.0 fl (7.5-11.1)   10/28/18  07:35    


 


Sodium  147 mmol/L (136-145)  H  10/28/18  07:35    


 


Potassium  4.0 mmol/L (3.5-5.1)   10/28/18  07:35    


 


Chloride  111 mmol/L ()  H  10/28/18  07:35    


 


Carbon Dioxide  27 mmol/L (21-32)   10/28/18  07:35    


 


Anion Gap  8 MMOL/L (8-16)   10/28/18  07:35    


 


BUN  11 mg/dL (7-18)   10/28/18  07:35    


 


Creatinine  0.6 mg/dL (0.55-1.3)   10/28/18  07:35    


 


Creat Clearance w eGFR  > 60  (>60)   10/28/18  07:35    


 


Random Glucose  85 mg/dL ()   10/28/18  07:35    


 


Calcium  8.2 mg/dL (8.5-10.1)  L  10/28/18  07:35    


 


Total Bilirubin  0.2 mg/dL (0.2-1)   10/28/18  07:35    


 


AST  5 U/L (15-37)  L  10/28/18  07:35    


 


ALT  14 U/L (13-61)   10/28/18  07:35    


 


Alkaline Phosphatase  71 U/L ()   10/28/18  07:35    


 


Total Protein  5.9 g/dl (6.4-8.2)  L  10/28/18  07:35    


 


Albumin  3.0 g/dl (3.4-5.0)  L  10/28/18  07:35    


 


Urine Color  Yellow   10/28/18  08:30    


 


Urine Appearance  Clear   10/28/18  08:30    


 


Urine pH  6.0  (5.0-8.0)   10/28/18  08:30    


 


Ur Specific Gravity  1.027  (1.010-1.035)   10/28/18  08:30    


 


Urine Protein  Negative  (NEGATIVE)   10/28/18  08:30    


 


Urine Glucose (UA)  Negative  (NEGATIVE)   10/28/18  08:30    


 


Urine Ketones  Negative  (NEGATIVE)   10/28/18  08:30    


 


Urine Blood  Negative  (NEGATIVE)   10/28/18  08:30    


 


Urine Nitrite  Negative  (NEGATIVE)   10/28/18  08:30    


 


Urine Bilirubin  Negative  (<2.0 mg/dL)   10/28/18  08:30    


 


Urine Urobilinogen  2.0 mg/dL (0.2-1.0)   10/28/18  08:30    


 


Ur Leukocyte Esterase  Negative  (NEGATIVE)   10/28/18  08:30    


 


RPR Titer  Nonreactive  (NONREACTIVE)   10/28/18  07:35    


 


HIV 1&2 Antibody Screen  Negative   10/28/18  07:35    


 


HIV P24 Antigen  Negative   10/28/18  07:35    








lab noted





- Treatment


Hospital Course: Detox Protocol Followed, Responded well


Patient has Accepted a Rehab Referral to: [facility]





- Medication


Discharge Medications: 


Ambulatory Orders





Amlodipine Besylate [Norvasc -] 5 mg PO DAILY #14 tablet 07/17/18 


Atorvastatin Calcium [Lipitor] 20 mg PO DAILY #14 tablet 07/17/18 











- Diagnosis


(1) Hypercholesterolemia


Current Visit: Yes   Status: Chronic   





(2) Hypertension


Current Visit: Yes   Status: Chronic   


Qualifiers: 


   Hypertension type: essential hypertension   Qualified Code(s): I10 - 

Essential (primary) hypertension   





(3) Nicotine dependence


Current Visit: Yes   Status: Acute   


Qualifiers: 


   Nicotine product type: cigarettes   Substance use status: in withdrawal   

Qualified Code(s): F17.213 - Nicotine dependence, cigarettes, with withdrawal   





(4) Nicotine dependence, uncomplicated


Current Visit: Yes   Status: Acute   


Qualifiers: 


   Nicotine product type: cigarettes   Qualified Code(s): F17.210 - Nicotine 

dependence, cigarettes, uncomplicated   





(5) Weight loss


Current Visit: Yes   Status: Acute   





- AMA


Did Patient Leave Against Medical Advice: Yes

## 2019-10-22 ENCOUNTER — HOSPITAL ENCOUNTER (INPATIENT)
Dept: HOSPITAL 74 - YASAS | Age: 54
LOS: 12 days | Discharge: HOME | DRG: 772 | End: 2019-11-03
Attending: NEUROMUSCULOSKELETAL MEDICINE & OMM | Admitting: NEUROMUSCULOSKELETAL MEDICINE & OMM
Payer: COMMERCIAL

## 2019-10-22 VITALS — BODY MASS INDEX: 22.4 KG/M2

## 2019-10-22 DIAGNOSIS — F19.24: ICD-10-CM

## 2019-10-22 DIAGNOSIS — F10.20: ICD-10-CM

## 2019-10-22 DIAGNOSIS — I10: ICD-10-CM

## 2019-10-22 DIAGNOSIS — Z59.0: ICD-10-CM

## 2019-10-22 DIAGNOSIS — F17.210: ICD-10-CM

## 2019-10-22 DIAGNOSIS — Z86.69: ICD-10-CM

## 2019-10-22 DIAGNOSIS — E78.00: ICD-10-CM

## 2019-10-22 DIAGNOSIS — F32.9: ICD-10-CM

## 2019-10-22 DIAGNOSIS — Z91.14: ICD-10-CM

## 2019-10-22 DIAGNOSIS — F41.9: ICD-10-CM

## 2019-10-22 DIAGNOSIS — R03.0: ICD-10-CM

## 2019-10-22 DIAGNOSIS — I25.2: ICD-10-CM

## 2019-10-22 DIAGNOSIS — Z91.011: ICD-10-CM

## 2019-10-22 DIAGNOSIS — F12.20: ICD-10-CM

## 2019-10-22 DIAGNOSIS — Z91.013: ICD-10-CM

## 2019-10-22 DIAGNOSIS — Z91.018: ICD-10-CM

## 2019-10-22 DIAGNOSIS — F11.20: Primary | ICD-10-CM

## 2019-10-22 LAB
ALBUMIN SERPL-MCNC: 3.9 G/DL (ref 3.4–5)
ALP SERPL-CCNC: 73 U/L (ref 45–117)
ALT SERPL-CCNC: 19 U/L (ref 13–61)
ANION GAP SERPL CALC-SCNC: 6 MMOL/L (ref 8–16)
AST SERPL-CCNC: 11 U/L (ref 15–37)
BILIRUB SERPL-MCNC: 0.3 MG/DL (ref 0.2–1)
BUN SERPL-MCNC: 13 MG/DL (ref 7–18)
CALCIUM SERPL-MCNC: 9.3 MG/DL (ref 8.5–10.1)
CHLORIDE SERPL-SCNC: 104 MMOL/L (ref 98–107)
CO2 SERPL-SCNC: 31 MMOL/L (ref 21–32)
CREAT SERPL-MCNC: 0.8 MG/DL (ref 0.55–1.3)
DEPRECATED RDW RBC AUTO: 13.4 % (ref 11.9–15.9)
GLUCOSE SERPL-MCNC: 95 MG/DL (ref 74–106)
HCT VFR BLD CALC: 41.6 % (ref 35.4–49)
HGB BLD-MCNC: 13.2 GM/DL (ref 11.7–16.9)
MCH RBC QN AUTO: 29.2 PG (ref 25.7–33.7)
MCHC RBC AUTO-ENTMCNC: 31.8 G/DL (ref 32–35.9)
MCV RBC: 92 FL (ref 80–96)
PLATELET # BLD AUTO: 258 K/MM3 (ref 134–434)
PMV BLD: 10.3 FL (ref 7.5–11.1)
POTASSIUM SERPLBLD-SCNC: 4.1 MMOL/L (ref 3.5–5.1)
PROT SERPL-MCNC: 7.2 G/DL (ref 6.4–8.2)
RBC # BLD AUTO: 4.53 M/MM3 (ref 4–5.6)
SODIUM SERPL-SCNC: 141 MMOL/L (ref 136–145)
WBC # BLD AUTO: 10 K/MM3 (ref 4–10)

## 2019-10-22 PROCEDURE — HZ42ZZZ GROUP COUNSELING FOR SUBSTANCE ABUSE TREATMENT, COGNITIVE-BEHAVIORAL: ICD-10-PCS | Performed by: ALLERGY & IMMUNOLOGY

## 2019-10-22 RX ADMIN — ATORVASTATIN CALCIUM SCH MG: 20 TABLET, FILM COATED ORAL at 22:00

## 2019-10-22 RX ADMIN — AMLODIPINE BESYLATE SCH MG: 5 TABLET ORAL at 14:20

## 2019-10-22 RX ADMIN — Medication SCH MG: at 22:00

## 2019-10-22 RX ADMIN — BUPRENORPHINE HYDROCHLORIDE, NALOXONE HYDROCHLORIDE SCH EACH: 8; 2 FILM, SOLUBLE BUCCAL; SUBLINGUAL at 14:20

## 2019-10-22 RX ADMIN — Medication PRN MG: at 22:00

## 2019-10-22 SDOH — ECONOMIC STABILITY - HOUSING INSECURITY: HOMELESSNESS: Z59.0

## 2019-10-22 NOTE — HP
COWS





- Scale


Resting Pulse: 0= [place] 80 or Below


Sweatin= Chills/Flushing


Restless Observation: 1= Difficult to Sit Still


Pupil Size: 0= Normal to Room Light


Bone or Joint Aches: 2= Severe Diffuse Aches


Runny Nose/ Eye Tearin= Nasal Congestion


GI Upset > 30mins: 3= Vomiting/Diarrhea


Tremor Observation: 2= Slight Tremor Visible


Yawning Observation: 0= None


Anxiety or Irritability: 2=Irritable/Anxious


Goose Flesh Skin: 0=Smooth Skin


COWS Score: 12





CIWA Score


Nausea/Vomiting: 3


Muscle Tremors: 2


Anxiety: 2


Agitation: 0-Normal Activity


Paroxysmal Sweats: 1-Minimal Palms Moist


Orientation: 0-Oriented


Tacttile Disturbances: 0-None


Auditory Disturbances: 0-None


Visual Disturbances: 0-None


Headache: 1-Very Mild


CIWA-Ar Total Score: 9





- Admission Criteria


[facility] Guidelines: Admission for Medically Managed Detox: 


Requires at least one of the followin. CIWA greater than 12


2. Seizures within the past 24 hours


3. Delirium tremens within the past 24 hours


4. Hallucinations within the past 24 hours


5. Acute intervention needed for co  occurring medical disorder


6. Acute intervention needed for co  occurring psychiatric disorder


7. Severe withdrawal that cannot be handled at a lower level of care (continued


    vomiting, continued diarrhea, abnormal vital signs) requiring intravenous


    medication and/or fluids


8. Pregnancy








Admitting History and Physical





- Smoking History


Smoking history: Current every day smoker


Have you smoked in the past 12 months: Yes


Aproximately how many cigarettes per day: 20





- Alcohol/Substance Use


Hx Alcohol Use: No





Admission ROS [facility]





- HPI


Chief Complaint: 





"detox, get medicated, move on"


Allergies/Adverse Reactions: 


 Allergies











Allergy/AdvReac Type Severity Reaction Status Date / Time


 


shellfish derived Allergy Severe Swelling Verified 10/22/19 10:59


 


tomato Allergy Severe Swelling Verified 18 11:05


 


Fish Containing Products Allergy Mild Rash Verified 10/22/19 10:59


 


No Known Drug Allergies Allergy   Verified 18 11:05


 


cheese AdvReac Intermediate Swelling Verified 10/22/19 10:59











History of Present Illness: 





54 year old male with a history hypertension, hypercholesterolemia, hx of MI, 

alcohol and heroin dependence here for detox. Previously here 1 year ago for 

detox, did not stay for rehab. Would like to stay for rehab on this admission. 

Formerly in methadone/suboxone program.





Heroin: 7-10 bags per day, last used yesterday, sniffs, never injected. Has OD'd

, last 5 months ago (7 total). Never had withdrawal seizures. Withdrawals 

include tremors, sweats, n/v. Longest period of sobriety 15 years starting 

. Started at age 13.





Alcohol: drinks 2x per week; 4-5 40s, no liquor; no withdrawal symptoms. Never 

had seizure from stopping alcohol.





Cocaine: 1x per month, last used yesterday; 0.5-1gram of cocaine when he does 

use





Suboxone: last took 3 days, 2 4ths; 1-2x per month





Marijunana: smoke 2-3 blunts/day





Cigarettes: 10-20cigs/day since 13 years old





Living Situation: lives in shelter (4-5 months), formerly on the street





Work: unemployed, [employer] truck





Family: Mother/father  (heart problems); brother had heart attack, has 

6 children; does not keep in contact with any of them





Surgeries: Never had surgeries before


Allergies: NKDA, allergic to yellow cheese, fish (severe anaphylaxis)





- Ebola screening


Have you traveled outside of the country in the last 21 days: No


Have you had contact with anyone from an Ebola affected area: No


Do you have a fever: No





- Review of Systems


Constitutional: Chills, Diaphoresis, Loss of Appetite


EENT: reports: Nose Congestion


Respiratory: reports: No Symptoms reported


Cardiac: reports: Lightheadedness


GI: reports: Diarrhea, Nausea, Vomiting


: reports: No Symptoms Reported


Musculoskeletal: reports: Back Pain


Integumentary: reports: Rash (Neck)


Neuro: reports: Headache, Tremors


Endocrine: reports: No Symptoms Reported


Hematology: reports: No Symptoms Reported


Psychiatric: reports: Judgement Intact, Mood/Affect Appropiate, Orientated x3, 

Anxious





Patient History





- Patient Medical History


Hx Anemia: No


Hx Asthma: No


Hx Chronic Obstructive Pulmonary Disease (COPD): No


Hx Cancer: No


Hx Cardiac Disorders: Yes (old MI at age 40)


Hx Congestive Heart Failure: No


Hx Hypertension: Yes


Hx Hypercholesterolemia: Yes (non adherent)


Hx Pacemaker: No


HX Cerebrovascular Accident: No


Hx Seizures: Yes (drug r/t seizure last attack 2 months ago)


Hx Dementia: No


Hx Diabetes: No


Hx Gastrointestinal Disorders: No


Hx Liver Disease: No


Hx Genitourinary Disorders: No


Hx Sexually Transmitted Disorders: No


Hx Renal Disease (ESRD): No


Hx Thyroid Disease: No


Hx Human Immunodeficiency Virus (HIV): No (Last Tested:  18 NEGATIVE)


Hx Hepatitis C: No


Hx Depression: Yes (AND ANXIETY)


Hx Suicide Attempt: No


Hx Bipolar Disorder: No


Hx Schizophrenia: No





- Patient Surgical History


Past Surgical History: No


Hx Neurologic Surgery: No


Hx Cataract Extraction: No


Hx Cardiac Surgery: No


Hx Lung Surgery: No


Hx Breast Surgery: No


Hx Breast Biopsy: No


Hx Abdominal Surgery: No


Hx Appendectomy: No


Hx Cholecystectomy: No


Hx Genitourinary Surgery: No


Hx  Section: No


Hx Orthopedic Surgery: No


Anesthesia Reaction: No





- PPD History


Date: 10/29/18


Results: 0 mm





- Smoking Cessation


Smoking history: Current every day smoker


Have you smoked in the past 12 months: Yes


Aproximately how many cigarettes per day: 20


Cigars Per Day: 0


Hx Chewing Tobacco Use: No


Initiated information on smoking cessation: Yes


'Breaking Loose' booklet given: 10/22/19





- Substances abused


  ** Heroin


Substance route: Inhalation


Frequency: Daily


Amount used: 8-12 bags


Age of first use: 13


Date of last use: 10/21/19





  ** Cocaine


Substance route: Inhalation


Frequency: 1-2 times per week


Amount used: half a gram


Age of first use: 13


Date of last use: 10/21/19





  ** Alcohol


Substance route: Oral


Frequency: 1-2 times per week


Amount used: (4-5) 40 oz bottles


Age of first use: 13


Date of last use: 10/21/19





  ** Marijuana/Hashish


Substance route: Smoking


Frequency: 3-6 times per week


Amount used: 1-2 dime bags


Age of first use: 15


Date of last use: 10/21/19





Admission Physical Exam [facility]





- Vital Signs


Vital Signs: 


 Vital Signs - 24 hr











  10/22/19





  11:02


 


Temperature 98.3 F


 


Pulse Rate 67


 


Respiratory 18





Rate 


 


Blood Pressure 146/77














- Physical


General Appearance: Yes: Within Normal Limits, No Apparent Distress, Nourished


HEENTM: Yes: Hearing grossly Normal, Normal ENT Inspection


Respiratory: Yes: Lungs Clear


Neck: Yes: Other (Superficial rash on neck)


Cardiology: Yes: Regular Rhythm, Regular Rate


Abdominal: Yes: Non Tender, Flat, Soft


Back: Yes: Normal Inspection


Musculoskeletal: Yes: full range of Motion


Neurological: Yes: CNs II-XII NML intact, Fully Oriented, Alert, Motor Strength 

5/5, Normal Mood/Affect


Integumentary: Yes: Rash





- Diagnostic


(1) Alcohol dependence


Current Visit: No   Status: Acute   





(2) Opioid dependence with withdrawal


Current Visit: No   Status: Acute   





(3) Anxiety and depression


Current Visit: No   Status: Chronic   





(4) Borderline high blood pressure


Current Visit: No   Status: Chronic   





Cleared for Admission [facility]





- Detox or Rehab


[facility] Level of Care: Medically Supervised





Breathalyzer





- Breathalyzer


Breathalyzer: 0





Urine Drug Screen





- Test Device


Lot number: [id number]


Expiration date: 21





- Control


Is test valid?: Yes





- Results


Drug screen NEGATIVE: No


Urine drug screen results: THC-Marijuana, YIMI-Cocaine, MOP-Opiates, BUP-Suboxone





Inpatient Rehab Admission





- Rehab Decision to Admit


Inpatient rehab admission?: No

## 2019-10-22 NOTE — PN
Teaching Attending Note


Name of Resident: Jayson Chaudhary





ATTENDING PHYSICIAN STATEMENT





I saw and evaluated the patient.


I reviewed the resident's note and discussed the case with the resident.


I agree with the resident's findings and plan as documented.








SUBJECTIVE:  Opioid  use while  in Buprenorphine program  , did not take meds 

today 


cannabis  : 2-3 blunts/day 


cocaine : 2-3  x  / month  


alcohol :  occasionally  








OBJECTIVE:  wnwd  





 Vital Signs - 24 hr











  10/22/19 10/22/19





  11:02 13:55


 


Temperature 98.3 F 98.3 F


 


Pulse Rate 67 61


 


Respiratory 18 18





Rate  


 


Blood Pressure 146/77 136/86








monthl;y rx for Buprenorphine , admits to  participation in program , states 

planning to go to Long -term and  taper  off  Suboxone  . 





ASSESSMENT AND PLAN: Cannabis  use  disorder  - rehab .

## 2019-10-23 LAB
APPEARANCE UR: CLEAR
BILIRUB UR STRIP.AUTO-MCNC: NEGATIVE MG/DL
COLOR UR: YELLOW
KETONES UR QL STRIP: NEGATIVE
LEUKOCYTE ESTERASE UR QL STRIP.AUTO: NEGATIVE
NITRITE UR QL STRIP: NEGATIVE
PH UR: 7.5 [PH] (ref 5–8)
PROT UR QL STRIP: NEGATIVE
PROT UR QL STRIP: NEGATIVE
SP GR UR: 1.02 (ref 1.01–1.03)
UROBILINOGEN UR STRIP-MCNC: 0.2 MG/DL (ref 0.2–1)

## 2019-10-23 RX ADMIN — NICOTINE SCH: 21 PATCH TRANSDERMAL at 10:51

## 2019-10-23 RX ADMIN — IBUPROFEN PRN MG: 400 TABLET, FILM COATED ORAL at 21:49

## 2019-10-23 RX ADMIN — ATORVASTATIN CALCIUM SCH MG: 20 TABLET, FILM COATED ORAL at 21:47

## 2019-10-23 RX ADMIN — Medication PRN MG: at 21:47

## 2019-10-23 RX ADMIN — Medication SCH MG: at 21:47

## 2019-10-23 RX ADMIN — Medication SCH TAB: at 10:51

## 2019-10-23 RX ADMIN — AMLODIPINE BESYLATE SCH MG: 5 TABLET ORAL at 10:51

## 2019-10-23 RX ADMIN — BUPRENORPHINE HYDROCHLORIDE, NALOXONE HYDROCHLORIDE SCH EACH: 8; 2 FILM, SOLUBLE BUCCAL; SUBLINGUAL at 10:53
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
## 2019-10-24 RX ADMIN — BUPRENORPHINE HYDROCHLORIDE, NALOXONE HYDROCHLORIDE SCH EACH: 8; 2 FILM, SOLUBLE BUCCAL; SUBLINGUAL at 10:41

## 2019-10-24 RX ADMIN — Medication SCH MG: at 21:51

## 2019-10-24 RX ADMIN — Medication PRN MG: at 21:51

## 2019-10-24 RX ADMIN — IBUPROFEN PRN MG: 400 TABLET, FILM COATED ORAL at 12:28

## 2019-10-24 RX ADMIN — ATORVASTATIN CALCIUM SCH MG: 20 TABLET, FILM COATED ORAL at 21:51

## 2019-10-24 RX ADMIN — NICOTINE SCH: 21 PATCH TRANSDERMAL at 10:40

## 2019-10-24 RX ADMIN — Medication SCH TAB: at 10:40

## 2019-10-24 RX ADMIN — AMLODIPINE BESYLATE SCH MG: 5 TABLET ORAL at 10:40

## 2019-10-25 RX ADMIN — Medication SCH MG: at 22:04

## 2019-10-25 RX ADMIN — BUPRENORPHINE HYDROCHLORIDE, NALOXONE HYDROCHLORIDE SCH EACH: 8; 2 FILM, SOLUBLE BUCCAL; SUBLINGUAL at 10:51

## 2019-10-25 RX ADMIN — Medication SCH TAB: at 10:50

## 2019-10-25 RX ADMIN — ATORVASTATIN CALCIUM SCH MG: 20 TABLET, FILM COATED ORAL at 22:03

## 2019-10-25 RX ADMIN — AMLODIPINE BESYLATE SCH MG: 5 TABLET ORAL at 10:50

## 2019-10-25 RX ADMIN — NICOTINE SCH: 21 PATCH TRANSDERMAL at 10:51

## 2019-10-25 RX ADMIN — IBUPROFEN PRN MG: 400 TABLET, FILM COATED ORAL at 22:04

## 2019-10-26 RX ADMIN — ATORVASTATIN CALCIUM SCH MG: 20 TABLET, FILM COATED ORAL at 21:45

## 2019-10-26 RX ADMIN — Medication SCH TAB: at 10:05

## 2019-10-26 RX ADMIN — Medication PRN MG: at 21:45

## 2019-10-26 RX ADMIN — AMLODIPINE BESYLATE SCH MG: 5 TABLET ORAL at 10:05

## 2019-10-26 RX ADMIN — NICOTINE SCH: 21 PATCH TRANSDERMAL at 10:06

## 2019-10-26 RX ADMIN — BUPRENORPHINE HYDROCHLORIDE, NALOXONE HYDROCHLORIDE SCH EACH: 8; 2 FILM, SOLUBLE BUCCAL; SUBLINGUAL at 10:05

## 2019-10-26 RX ADMIN — Medication SCH MG: at 21:45

## 2019-10-27 RX ADMIN — ATORVASTATIN CALCIUM SCH: 20 TABLET, FILM COATED ORAL at 21:39

## 2019-10-27 RX ADMIN — Medication SCH TAB: at 10:34

## 2019-10-27 RX ADMIN — NICOTINE SCH: 21 PATCH TRANSDERMAL at 10:34

## 2019-10-27 RX ADMIN — AMLODIPINE BESYLATE SCH MG: 5 TABLET ORAL at 10:34

## 2019-10-27 RX ADMIN — Medication SCH: at 21:40

## 2019-10-27 RX ADMIN — BUPRENORPHINE HYDROCHLORIDE, NALOXONE HYDROCHLORIDE SCH EACH: 8; 2 FILM, SOLUBLE BUCCAL; SUBLINGUAL at 10:34

## 2019-10-28 RX ADMIN — ATORVASTATIN CALCIUM SCH: 20 TABLET, FILM COATED ORAL at 22:00

## 2019-10-28 RX ADMIN — Medication SCH: at 22:00

## 2019-10-28 RX ADMIN — AMLODIPINE BESYLATE SCH MG: 5 TABLET ORAL at 11:06

## 2019-10-28 RX ADMIN — NICOTINE SCH: 21 PATCH TRANSDERMAL at 11:08

## 2019-10-28 RX ADMIN — BUPRENORPHINE HYDROCHLORIDE, NALOXONE HYDROCHLORIDE SCH EACH: 8; 2 FILM, SOLUBLE BUCCAL; SUBLINGUAL at 11:07

## 2019-10-28 RX ADMIN — Medication SCH TAB: at 11:06

## 2019-10-29 RX ADMIN — BUPRENORPHINE HYDROCHLORIDE, NALOXONE HYDROCHLORIDE SCH EACH: 8; 2 FILM, SOLUBLE BUCCAL; SUBLINGUAL at 10:40

## 2019-10-29 RX ADMIN — ATORVASTATIN CALCIUM SCH: 20 TABLET, FILM COATED ORAL at 21:47

## 2019-10-29 RX ADMIN — AMLODIPINE BESYLATE SCH MG: 5 TABLET ORAL at 10:40

## 2019-10-29 RX ADMIN — Medication SCH TAB: at 10:40

## 2019-10-29 RX ADMIN — NICOTINE SCH: 21 PATCH TRANSDERMAL at 10:40

## 2019-10-29 RX ADMIN — Medication SCH: at 21:47

## 2019-10-30 RX ADMIN — NICOTINE SCH: 21 PATCH TRANSDERMAL at 10:54

## 2019-10-30 RX ADMIN — AMLODIPINE BESYLATE SCH MG: 5 TABLET ORAL at 10:55

## 2019-10-30 RX ADMIN — ATORVASTATIN CALCIUM SCH: 20 TABLET, FILM COATED ORAL at 21:58

## 2019-10-30 RX ADMIN — Medication SCH TAB: at 10:55

## 2019-10-30 RX ADMIN — Medication SCH: at 21:58

## 2019-10-30 RX ADMIN — BUPRENORPHINE HYDROCHLORIDE, NALOXONE HYDROCHLORIDE SCH EACH: 8; 2 FILM, SOLUBLE BUCCAL; SUBLINGUAL at 10:55

## 2019-10-31 RX ADMIN — BUPRENORPHINE HYDROCHLORIDE, NALOXONE HYDROCHLORIDE SCH EACH: 8; 2 FILM, SOLUBLE BUCCAL; SUBLINGUAL at 10:59

## 2019-10-31 RX ADMIN — Medication SCH: at 21:55

## 2019-10-31 RX ADMIN — ATORVASTATIN CALCIUM SCH: 20 TABLET, FILM COATED ORAL at 21:55

## 2019-10-31 RX ADMIN — NICOTINE SCH: 21 PATCH TRANSDERMAL at 11:00

## 2019-10-31 RX ADMIN — Medication SCH TAB: at 10:58

## 2019-10-31 RX ADMIN — AMLODIPINE BESYLATE SCH MG: 5 TABLET ORAL at 10:58

## 2019-11-01 RX ADMIN — AMLODIPINE BESYLATE SCH MG: 5 TABLET ORAL at 10:31

## 2019-11-01 RX ADMIN — NICOTINE SCH: 21 PATCH TRANSDERMAL at 10:31

## 2019-11-01 RX ADMIN — Medication SCH: at 21:41

## 2019-11-01 RX ADMIN — Medication SCH TAB: at 10:31

## 2019-11-01 RX ADMIN — BUPRENORPHINE HYDROCHLORIDE, NALOXONE HYDROCHLORIDE SCH EACH: 8; 2 FILM, SOLUBLE BUCCAL; SUBLINGUAL at 10:31

## 2019-11-01 RX ADMIN — ATORVASTATIN CALCIUM SCH: 20 TABLET, FILM COATED ORAL at 21:41

## 2019-11-02 RX ADMIN — Medication SCH TAB: at 10:15

## 2019-11-02 RX ADMIN — BUPRENORPHINE HYDROCHLORIDE, NALOXONE HYDROCHLORIDE SCH EACH: 8; 2 FILM, SOLUBLE BUCCAL; SUBLINGUAL at 10:15

## 2019-11-02 RX ADMIN — Medication SCH: at 21:47

## 2019-11-02 RX ADMIN — AMLODIPINE BESYLATE SCH MG: 5 TABLET ORAL at 10:15

## 2019-11-02 RX ADMIN — NICOTINE SCH: 21 PATCH TRANSDERMAL at 10:16

## 2019-11-02 RX ADMIN — ATORVASTATIN CALCIUM SCH: 20 TABLET, FILM COATED ORAL at 21:47

## 2019-11-03 VITALS — HEART RATE: 81 BPM | TEMPERATURE: 98.1 F | SYSTOLIC BLOOD PRESSURE: 146 MMHG | DIASTOLIC BLOOD PRESSURE: 77 MMHG

## 2019-11-03 RX ADMIN — Medication SCH TAB: at 09:38

## 2019-11-03 RX ADMIN — BUPRENORPHINE HYDROCHLORIDE, NALOXONE HYDROCHLORIDE SCH EACH: 8; 2 FILM, SOLUBLE BUCCAL; SUBLINGUAL at 09:38

## 2019-11-03 RX ADMIN — ATORVASTATIN CALCIUM SCH: 20 TABLET, FILM COATED ORAL at 21:34

## 2019-11-03 RX ADMIN — Medication SCH: at 21:34

## 2019-11-03 RX ADMIN — AMLODIPINE BESYLATE SCH MG: 5 TABLET ORAL at 09:38

## 2019-11-03 RX ADMIN — NICOTINE SCH: 21 PATCH TRANSDERMAL at 09:38

## 2019-11-03 NOTE — DS
[facility] Discharge Summary





- [facility] Discharge Summary


Admission Date: 10/22/19


Discharge Date: 11/04/19





- History


Present History: Alcohol dependence, Cannabis dependence, Cocaine dependence, 

Opioid dependence


Additional Comments: 





CLIENT REQUESTING TO LEAVE BEFORE SCHEDULE DC FOR 11/4/2019. STATES HE DOES NOT 

WANT TO LOSE HIS BED AT THE [facility] AND NEEDS TO GET THERE BEFORE 11/4/2019


Pertinent Past History: 





HLD


MI


SEIZURE


DEPRESSION


HTN





- Discharge Physical Exam


Vital Signs: 


 Vital Signs











Temperature  97.9 F   11/03/19 06:55


 


Pulse Rate  90   11/03/19 10:00


 


Respiratory Rate  18   11/03/19 06:55


 


Blood Pressure  115/77   11/03/19 10:00


 


O2 Sat by Pulse Oximetry (%)      











Pertinent Admission Physical Exam Findings: 





S/P DETOX FOR ALCOHOL AND HEROIN. ADMITTED ON 10/22/2019 FOR REHAB. 





- Treatment


Discharge Condition: Outpatient referral accepted (CLIENT IS RETURNING BACK TO 

[facility]IS [facility]ERE HE  RECIEVES ALL MEDICAL CARE AND HIS SUBOXONE MGMT)





- Medication


Discharge Medications: 


Ambulatory Orders





Amlodipine Besylate [Norvasc -] 5 mg PO DAILY #14 tablet 07/17/18 


Atorvastatin Calcium [Lipitor] 20 mg PO DAILY #14 tablet 07/17/18 











- Medication-Assisted Treatment (MAT)


Medication-Assisted Treatment (MAT): Yes


Medication Prescribed: Buprenorphine





- Discharge Instructions


Diet, activity, other medical instructions: 





Diet:





Activity: 





Other medical instructions:








- Diagnosis


(1) Opioid dependence, uncomplicated


Status: Acute   





(2) Nicotine dependence, uncomplicated


Status: Acute   


Qualifiers: 


   Nicotine product type: cigarettes   Qualified Code(s): F17.210 - Nicotine 

dependence, cigarettes, uncomplicated   





(3) Borderline high blood pressure


Status: Chronic   





(4) Cannabis dependence


Status: Chronic   





(5) Hypercholesterolemia


Status: Chronic   





(6) Hypertension


Status: Chronic   


Qualifiers: 


   Hypertension type: essential hypertension   Qualified Code(s): I10 - 

Essential (primary) hypertension   





(7) Opioid dependence


Status: Chronic   


Qualifiers: 


   Substance use status: uncomplicated   Qualified Code(s): F11.20 - Opioid 

dependence, uncomplicated   





(8) History of hypercholesterolemia


Status: Suspected   





(9) Substance induced mood disorder


Status: Suspected   





(10) History of MI (myocardial infarction)


Status: Resolved   





- Follow-up Referral


Minutes to complete discharge: 30





- AMA


Did Patient Leave Against Medical Advice: No

## 2019-11-04 NOTE — PN
BHS Progress Note


Note: 


PATIENT PRESENTS WITH C/O INSOMNIA, HEADACHE, ANXIETY AND OPIOD CRAVINGS. HAS 

HX OF HEROIN DEPENDENCE AND TREATED WITH SUBOXONE IN PAST. LAST TREATMENT WITH 

SUBOXONE BY DR. LADI CALDWELL IN 4/28/18 8MG/4MG DAILY #30. PT NON-COMPLIANT WITH 

PROGRAM AFTER DEATH OF SON. PATIENT STATES HE IS HOMELESS. TO SPEAK WITH 

COUNSELOR REGARDING REFERRING TO OUTPATIENT SUBOXONE PROGRAM IF PATIENT TO 

RESTART TREATMENT WHILE IN REHAB. PT TO FOLLOW UP WITH PROVIDER ONCE HE SPEAKS 

WITH COUNSELOR.
BHS Progress Note (SOAP)


Subjective: 





ANXIETY,SWEATS,FATIGUE AND  PT FOCUSING ON AFTERCARE NOW. 


Objective: 





07/11/18 10:51


 Vital Signs











  07/11/18 07/11/18 07/11/18





  03:30 06:16 09:21


 


Temperature  97.8 F 98.2 F


 


Pulse Rate  47 L 64


 


Respiratory 18 16 18





Rate   


 


Blood Pressure  125/72 131/79








 Laboratory Tests











  07/08/18 07/09/18 07/09/18





  11:38 06:30 06:30


 


WBC   11.3 H 


 


RBC   4.41 


 


Hgb   12.9 


 


Hct   39.2 


 


MCV   88.8 


 


MCH   29.2 


 


MCHC   32.9 


 


RDW   13.9 


 


Plt Count   219 


 


MPV   10.0 


 


Sodium    143


 


Potassium    4.0


 


Chloride    106


 


Carbon Dioxide    32


 


Anion Gap    5 L


 


BUN    11


 


Creatinine    0.9


 


Creat Clearance w eGFR    > 60


 


Random Glucose    82


 


Calcium    8.2 L


 


Total Bilirubin    0.3


 


AST    10 L D


 


ALT    19


 


Alkaline Phosphatase    65


 


Total Protein    6.2 L


 


Albumin    3.2 L


 


Urine Color  Ltyellow  


 


Urine Appearance  Clear  


 


Urine pH  6.0  


 


Ur Specific [place]  1.011  


 


Urine Protein  Negative  


 


Urine Glucose (UA)  Negative  


 


Urine Ketones  Negative  


 


Urine Blood  Negative  


 


Urine Nitrite  Negative  


 


Urine Bilirubin  Negative  


 


Urine Urobilinogen  Negative  


 


Ur Leukocyte Esterase  Negative  


 


HIV 1&2 Antibody Screen   


 


HIV P24 Antigen   














  07/09/18





  06:30


 


WBC 


 


RBC 


 


Hgb 


 


Hct 


 


MCV 


 


MCH 


 


MCHC 


 


RDW 


 


Plt Count 


 


MPV 


 


Sodium 


 


Potassium 


 


Chloride 


 


Carbon Dioxide 


 


Anion Gap 


 


BUN 


 


Creatinine 


 


Creat Clearance w eGFR 


 


Random Glucose 


 


Calcium 


 


Total Bilirubin 


 


AST 


 


ALT 


 


Alkaline Phosphatase 


 


Total Protein 


 


Albumin 


 


Urine Color 


 


Urine Appearance 


 


Urine pH 


 


Ur Specific [place] 


 


Urine Protein 


 


Urine Glucose (UA) 


 


Urine Ketones 


 


Urine Blood 


 


Urine Nitrite 


 


Urine Bilirubin 


 


Urine Urobilinogen 


 


Ur Leukocyte Esterase 


 


HIV 1&2 Antibody Screen  Negative


 


HIV P24 Antigen  Negative











Assessment: 





07/11/18 10:52


WITHDRAWAL SX


Plan: 





CONTINUE DETOX


FOLLOW UP WITH COUNSELOR LAVINIA EDOUARD TODAY TO DISCUSS AFTERCARE PLANS.
BHS Progress Note (SOAP)


Subjective: 





DECREASED ANXIETY, COLORING AT BEDSIDE. REPORTS DETOX PROCEEDING WELL.


Objective: 





07/12/18 11:18


 Vital Signs











  07/12/18 07/12/18 07/12/18





  03:30 06:23 09:41


 


Temperature  96.1 F L 99.1 F


 


Pulse Rate  51 L 72


 


Respiratory 18 18 18





Rate   


 


Blood Pressure  130/83 128/88








 Laboratory Tests











  07/08/18 07/09/18 07/09/18





  11:38 06:30 06:30


 


WBC   11.3 H 


 


RBC   4.41 


 


Hgb   12.9 


 


Hct   39.2 


 


MCV   88.8 


 


MCH   29.2 


 


MCHC   32.9 


 


RDW   13.9 


 


Plt Count   219 


 


MPV   10.0 


 


Sodium    143


 


Potassium    4.0


 


Chloride    106


 


Carbon Dioxide    32


 


Anion Gap    5 L


 


BUN    11


 


Creatinine    0.9


 


Creat Clearance w eGFR    > 60


 


Random Glucose    82


 


Calcium    8.2 L


 


Total Bilirubin    0.3


 


AST    10 L D


 


ALT    19


 


Alkaline Phosphatase    65


 


Total Protein    6.2 L


 


Albumin    3.2 L


 


Urine Color  Ltyellow  


 


Urine Appearance  Clear  


 


Urine pH  6.0  


 


Ur Specific [place]  1.011  


 


Urine Protein  Negative  


 


Urine Glucose (UA)  Negative  


 


Urine Ketones  Negative  


 


Urine Blood  Negative  


 


Urine Nitrite  Negative  


 


Urine Bilirubin  Negative  


 


Urine Urobilinogen  Negative  


 


Ur Leukocyte Esterase  Negative  


 


RPR Titer   


 


HIV 1&2 Antibody Screen   


 


HIV P24 Antigen   














  07/09/18 07/09/18





  06:30 06:30


 


WBC  


 


RBC  


 


Hgb  


 


Hct  


 


MCV  


 


MCH  


 


MCHC  


 


RDW  


 


Plt Count  


 


MPV  


 


Sodium  


 


Potassium  


 


Chloride  


 


Carbon Dioxide  


 


Anion Gap  


 


BUN  


 


Creatinine  


 


Creat Clearance w eGFR  


 


Random Glucose  


 


Calcium  


 


Total Bilirubin  


 


AST  


 


ALT  


 


Alkaline Phosphatase  


 


Total Protein  


 


Albumin  


 


Urine Color  


 


Urine Appearance  


 


Urine pH  


 


Ur Specific [place]  


 


Urine Protein  


 


Urine Glucose (UA)  


 


Urine Ketones  


 


Urine Blood  


 


Urine Nitrite  


 


Urine Bilirubin  


 


Urine Urobilinogen  


 


Ur Leukocyte Esterase  


 


RPR Titer  Nonreactive 


 


HIV 1&2 Antibody Screen   Negative


 


HIV P24 Antigen   Negative











Assessment: 





07/12/18 11:19


WITHDRAWAL SX


Plan: 





CONTINUE DETOX
BHS Progress Note (SOAP)


Subjective: 





DETOX COMPLETED. ALERT O X 3. NAD. PT REFERRED TO REHAB TODAY.


Objective: 





07/13/18 18:28


 Vital Signs - 24 hr











  07/12/18 07/13/18 07/13/18





  22:00 00:30 03:30


 


Temperature 98.0 F  


 


Pulse Rate 50 L  


 


Respiratory 18 18 18





Rate   


 


Blood Pressure 153/81  














  07/13/18





  06:33


 


Temperature 97.1 F L


 


Pulse Rate 47 L


 


Respiratory 18





Rate 


 


Blood Pressure 142/89








 Laboratory Tests











  07/08/18 07/09/18 07/09/18





  11:38 06:30 06:30


 


WBC   11.3 H 


 


RBC   4.41 


 


Hgb   12.9 


 


Hct   39.2 


 


MCV   88.8 


 


MCH   29.2 


 


MCHC   32.9 


 


RDW   13.9 


 


Plt Count   219 


 


MPV   10.0 


 


Sodium    143


 


Potassium    4.0


 


Chloride    106


 


Carbon Dioxide    32


 


Anion Gap    5 L


 


BUN    11


 


Creatinine    0.9


 


Creat Clearance w eGFR    > 60


 


Random Glucose    82


 


Calcium    8.2 L


 


Total Bilirubin    0.3


 


AST    10 L D


 


ALT    19


 


Alkaline Phosphatase    65


 


Total Protein    6.2 L


 


Albumin    3.2 L


 


Urine Color  Ltyellow  


 


Urine Appearance  Clear  


 


Urine pH  6.0  


 


Ur Specific [place]  1.011  


 


Urine Protein  Negative  


 


Urine Glucose (UA)  Negative  


 


Urine Ketones  Negative  


 


Urine Blood  Negative  


 


Urine Nitrite  Negative  


 


Urine Bilirubin  Negative  


 


Urine Urobilinogen  Negative  


 


Ur Leukocyte Esterase  Negative  


 


RPR Titer   


 


HIV 1&2 Antibody Screen   


 


HIV P24 Antigen   














  07/09/18 07/09/18





  06:30 06:30


 


WBC  


 


RBC  


 


Hgb  


 


Hct  


 


MCV  


 


MCH  


 


MCHC  


 


RDW  


 


Plt Count  


 


MPV  


 


Sodium  


 


Potassium  


 


Chloride  


 


Carbon Dioxide  


 


Anion Gap  


 


BUN  


 


Creatinine  


 


Creat Clearance w eGFR  


 


Random Glucose  


 


Calcium  


 


Total Bilirubin  


 


AST  


 


ALT  


 


Alkaline Phosphatase  


 


Total Protein  


 


Albumin  


 


Urine Color  


 


Urine Appearance  


 


Urine pH  


 


Ur Specific [place]  


 


Urine Protein  


 


Urine Glucose (UA)  


 


Urine Ketones  


 


Urine Blood  


 


Urine Nitrite  


 


Urine Bilirubin  


 


Urine Urobilinogen  


 


Ur Leukocyte Esterase  


 


RPR Titer  Nonreactive 


 


HIV 1&2 Antibody Screen   Negative


 


HIV P24 Antigen   Negative











Assessment: 





07/13/18 18:28


MEDICALLY STABLE


Plan: 





D/C PT TODAY
[facility] CIWA





- CIWA Score


Nausea/Vomitin-No Nausea/No Vomiting


Muscle Tremors: 4-Moderate,w/Arms Extend


Anxiety: 4-Mod. Anxious/Guarded


Agitation: 5


Paroxysmal Sweats: 1-Minimal Palms Moist


Orientation: 0-Oriented


Tacttile Disturbances: 0-None


Auditory Disturbances: 0-None


Visual Disturbances: 0-None


Headache: 0-None Present


CIWA-Ar Total Score: 14





BHS COWS





- Scale


Resting Pulse: 0= [place] 80 or Below


Sweatin= Chills/Flushing


Restless Observation: 3= Extraneous Movement


Pupil Size: 0= Normal to Room Light


Bone or Joint Aches: 4=Acute Joint/Muscle Pain


Runny Nose/ Eye Tearin= None


GI Upset > 30mins: 0= None


Tremor Observation of Outstretched Hands: 2= Slight Tremor Visible


Yawning Observation: 0= None


Anxiety or Irritability: 2=Irritable/Anxious


Goose Flesh Skin: 0=Smooth Skin


COWS Score: 12





BHS Progress Note (SOAP)


Subjective: 





ANXIETY,IRRITABILITY,FATIGUE.


Objective: 





07/10/18 12:28


 Vital Signs











  07/10/18 07/10/18





  06:13 09:22


 


Temperature 97.0 F L 97.7 F


 


Pulse Rate 61 69


 


Respiratory 18 18





Rate  


 


Blood Pressure 116/72 114/73








 Laboratory Tests











  18





  11:38 06:30 06:30


 


WBC   11.3 H 


 


RBC   4.41 


 


Hgb   12.9 


 


Hct   39.2 


 


MCV   88.8 


 


MCH   29.2 


 


MCHC   32.9 


 


RDW   13.9 


 


Plt Count   219 


 


MPV   10.0 


 


Sodium    143


 


Potassium    4.0


 


Chloride    106


 


Carbon Dioxide    32


 


Anion Gap    5 L


 


BUN    11


 


Creatinine    0.9


 


Creat Clearance w eGFR    > 60


 


Random Glucose    82


 


Calcium    8.2 L


 


Total Bilirubin    0.3


 


AST    10 L D


 


ALT    19


 


Alkaline Phosphatase    65


 


Total Protein    6.2 L


 


Albumin    3.2 L


 


Urine Color  Ltyellow  


 


Urine Appearance  Clear  


 


Urine pH  6.0  


 


Ur Specific [place]  1.011  


 


Urine Protein  Negative  


 


Urine Glucose (UA)  Negative  


 


Urine Ketones  Negative  


 


Urine Blood  Negative  


 


Urine Nitrite  Negative  


 


Urine Bilirubin  Negative  


 


Urine Urobilinogen  Negative  


 


Ur Leukocyte Esterase  Negative  


 


HIV 1&2 Antibody Screen   


 


HIV P24 Antigen   














  18





  06:30


 


WBC 


 


RBC 


 


Hgb 


 


Hct 


 


MCV 


 


MCH 


 


MCHC 


 


RDW 


 


Plt Count 


 


MPV 


 


Sodium 


 


Potassium 


 


Chloride 


 


Carbon Dioxide 


 


Anion Gap 


 


BUN 


 


Creatinine 


 


Creat Clearance w eGFR 


 


Random Glucose 


 


Calcium 


 


Total Bilirubin 


 


AST 


 


ALT 


 


Alkaline Phosphatase 


 


Total Protein 


 


Albumin 


 


Urine Color 


 


Urine Appearance 


 


Urine pH 


 


Ur Specific [place] 


 


Urine Protein 


 


Urine Glucose (UA) 


 


Urine Ketones 


 


Urine Blood 


 


Urine Nitrite 


 


Urine Bilirubin 


 


Urine Urobilinogen 


 


Ur Leukocyte Esterase 


 


HIV 1&2 Antibody Screen  Negative


 


HIV P24 Antigen  Negative











Assessment: 





07/10/18 12:29


WITHDRAWAL SX


Plan: 





CONTINUE DETOX
S CIWA





- CIWA Score


Nausea/Vomitin-No Nausea/No Vomiting


Muscle Tremors: 4-Moderate,w/Arms Extend


Anxiety: 5


Agitation: 4-Moderately Restless


Paroxysmal Sweats: 1-Minimal Palms Moist


Orientation: 0-Oriented


Tacttile Disturbances: 0-None


Auditory Disturbances: 0-None


Visual Disturbances: 0-None


Headache: 0-None Present


CIWA-Ar Total Score: 14





BHS COWS





- Scale


Resting Pulse: 0= [place] 80 or Below


Sweatin= Chills/Flushing


Restless Observation: 3= Extraneous Movement


Pupil Size: 0= Normal to Room Light


Bone or Joint Aches: 4=Acute Joint/Muscle Pain


Runny Nose/ Eye Tearin= None


GI Upset > 30mins: 0= None


Tremor Observation of Outstretched Hands: 2= Slight Tremor Visible


Yawning Observation: 1= 1-2x During Session


Anxiety or Irritability: 2=Irritable/Anxious


Goose Flesh Skin: 0=Smooth Skin


COWS Score: 13





BHS Progress Note (SOAP)


Subjective: 





PT C/O ANXIETY,IRRITABILITY,BACK PAIN/LEG PAIN, INTERMITTENT SLEEP.


Objective: 





18 14:20


 Vital Signs











  18





  06:45 09:23 13:01


 


Temperature 97.4 F L 97.1 F L 98.4 F


 


Pulse Rate 63 73 64


 


Respiratory 18 18 18





Rate   


 


Blood Pressure 104/68 117/76 117/69








 Laboratory Last Values











WBC  11.3 K/mm3 (4.0-10.0)  H  18  06:30    


 


RBC  4.41 M/mm3 (4.00-5.60)   18  06:30    


 


Hgb  12.9 GM/dL (11.7-16.9)   18  06:30    


 


Hct  39.2 % (35.4-49)   18  06:30    


 


MCV  88.8 fl (80-96)   18  06:30    


 


MCH  29.2 pg (25.7-33.7)   18  06:30    


 


MCHC  32.9 g/dl (32.0-35.9)   18  06:30    


 


RDW  13.9 % (11.9-15.9)   07/09/18  06:30    


 


Plt Count  219 K/MM3 (134-434)   18  06:30    


 


MPV  10.0 fl (7.5-11.1)   18  06:30    


 


Sodium  143 mmol/L (136-145)   18  06:30    


 


Potassium  4.0 mmol/L (3.5-5.1)   18  06:30    


 


Chloride  106 mmol/L ()   18  06:30    


 


Carbon Dioxide  32 mmol/L (21-32)   18  06:30    


 


Anion Gap  5  (8-16)  L  18  06:30    


 


BUN  11 mg/dL (7-18)   18  06:30    


 


Creatinine  0.9 mg/dL (0.7-1.3)   18  06:30    


 


Creat Clearance w eGFR  > 60  (>60)   18  06:30    


 


Random Glucose  82 mg/dL ()   18  06:30    


 


Calcium  8.2 mg/dL (8.5-10.1)  L  18  06:30    


 


Total Bilirubin  0.3 mg/dL (0.2-1.0)   18  06:30    


 


AST  10 U/L (15-37)  L D 18  06:30    


 


ALT  19 U/L (12-78)   18  06:30    


 


Alkaline Phosphatase  65 U/L ()   18  06:30    


 


Total Protein  6.2 g/dl (6.4-8.2)  L  18  06:30    


 


Albumin  3.2 g/dl (3.4-5.0)  L  18  06:30    


 


Urine Color  Ltyellow   18  11:38    


 


Urine Appearance  Clear   18  11:38    


 


Urine pH  6.0  (5.0-8.0)   18  11:38    


 


Ur Specific Grav[place]  1.011  (1.001-1.035)   18  11:38    


 


Urine Protein  Negative  (NEGATIVE)   18  11:38    


 


Urine Glucose (UA)  Negative  (NEGATIVE)   18  11:38    


 


Urine Ketones  Negative  (NEGATIVE)   18  11:38    


 


Urine Blood  Negative  (NEGATIVE)   18  11:38    


 


Urine Nitrite  Negative  (NEGATIVE)   18  11:38    


 


Urine Bilirubin  Negative  (<2.0 mg/dL)   18  11:38    


 


Urine Urobilinogen  Negative mg/dL (0.2-1.0)   18  11:38    


 


Ur Leukocyte Esterase  Negative  (NEGATIVE)   18  11:38    


 


HIV 1&2 Antibody Screen  Negative   18  06:30    


 


HIV P24 Antigen  Negative   18  06:30    











Assessment: 





18 14:20


WITHDRAWAL SX


Plan: 





CONTINUE DETOX/PRESENT PROTOCOL
none

## 2020-01-19 ENCOUNTER — HOSPITAL ENCOUNTER (INPATIENT)
Dept: HOSPITAL 74 - YASAS | Age: 55
LOS: 5 days | Discharge: HOME | DRG: 773 | End: 2020-01-24
Attending: ALLERGY & IMMUNOLOGY | Admitting: ALLERGY & IMMUNOLOGY
Payer: COMMERCIAL

## 2020-01-19 VITALS — BODY MASS INDEX: 22.1 KG/M2

## 2020-01-19 DIAGNOSIS — Z91.018: ICD-10-CM

## 2020-01-19 DIAGNOSIS — G40.509: ICD-10-CM

## 2020-01-19 DIAGNOSIS — I25.2: ICD-10-CM

## 2020-01-19 DIAGNOSIS — F12.20: ICD-10-CM

## 2020-01-19 DIAGNOSIS — F41.8: ICD-10-CM

## 2020-01-19 DIAGNOSIS — I10: ICD-10-CM

## 2020-01-19 DIAGNOSIS — F11.23: Primary | ICD-10-CM

## 2020-01-19 DIAGNOSIS — F17.210: ICD-10-CM

## 2020-01-19 DIAGNOSIS — I25.10: ICD-10-CM

## 2020-01-19 DIAGNOSIS — Z91.013: ICD-10-CM

## 2020-01-19 DIAGNOSIS — E78.00: ICD-10-CM

## 2020-01-19 DIAGNOSIS — Z59.0: ICD-10-CM

## 2020-01-19 DIAGNOSIS — F32.9: ICD-10-CM

## 2020-01-19 PROCEDURE — HZ2ZZZZ DETOXIFICATION SERVICES FOR SUBSTANCE ABUSE TREATMENT: ICD-10-PCS | Performed by: ALLERGY & IMMUNOLOGY

## 2020-01-19 RX ADMIN — Medication SCH: at 23:06

## 2020-01-19 SDOH — ECONOMIC STABILITY - HOUSING INSECURITY: HOMELESSNESS: Z59.0
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
## 2020-01-19 NOTE — PN
[facility] Progress Note


Note: 





pt was very irate as to why his first dose of methadone was so low. chart 

reviewed and dose of methadone adjusted pt is now happy and in agreement of new 

updated dose taper. General Sunscreen Counseling: I recommended a broad spectrum sunscreen with a SPF of 30 or higher.  I explained that SPF 30 sunscreens block approximately 97 percent of the sun's harmful rays.  Sunscreens should be applied at least 15 minutes prior to expected sun exposure and then every 2 hours after that as long as sun exposure continues. If swimming or exercising sunscreen should be reapplied every 45 minutes to an hour after getting wet or sweating.  One ounce, or the equivalent of a shot glass full of sunscreen, is adequate to protect the skin not covered by a bathing suit. I also recommended a lip balm with a sunscreen as well. Sun protective clothing can be used in lieu of sunscreen but must be worn the entire time you are exposed to the sun's rays. Detail Level: Generalized

## 2020-01-20 LAB
ALBUMIN SERPL-MCNC: 3.2 G/DL (ref 3.4–5)
ALP SERPL-CCNC: 59 U/L (ref 45–117)
ALT SERPL-CCNC: 16 U/L (ref 13–61)
ANION GAP SERPL CALC-SCNC: 4 MMOL/L (ref 8–16)
AST SERPL-CCNC: 7 U/L (ref 15–37)
BILIRUB SERPL-MCNC: 0.2 MG/DL (ref 0.2–1)
BUN SERPL-MCNC: 12 MG/DL (ref 7–18)
CALCIUM SERPL-MCNC: 8.7 MG/DL (ref 8.5–10.1)
CHLORIDE SERPL-SCNC: 107 MMOL/L (ref 98–107)
CO2 SERPL-SCNC: 30 MMOL/L (ref 21–32)
CREAT SERPL-MCNC: 0.9 MG/DL (ref 0.55–1.3)
DEPRECATED RDW RBC AUTO: 14.1 % (ref 11.9–15.9)
GLUCOSE SERPL-MCNC: 85 MG/DL (ref 74–106)
HCT VFR BLD CALC: 39.6 % (ref 35.4–49)
HGB BLD-MCNC: 13.1 GM/DL (ref 11.7–16.9)
MCH RBC QN AUTO: 30.1 PG (ref 25.7–33.7)
MCHC RBC AUTO-ENTMCNC: 33.2 G/DL (ref 32–35.9)
MCV RBC: 90.8 FL (ref 80–96)
PLATELET # BLD AUTO: 236 K/MM3 (ref 134–434)
PMV BLD: 9.7 FL (ref 7.5–11.1)
POTASSIUM SERPLBLD-SCNC: 4 MMOL/L (ref 3.5–5.1)
PROT SERPL-MCNC: 6.2 G/DL (ref 6.4–8.2)
RBC # BLD AUTO: 4.36 M/MM3 (ref 4–5.6)
SODIUM SERPL-SCNC: 141 MMOL/L (ref 136–145)
WBC # BLD AUTO: 6.4 K/MM3 (ref 4–10)

## 2020-01-20 RX ADMIN — Medication SCH MG: at 21:24

## 2020-01-20 RX ADMIN — Medication SCH TAB: at 10:34

## 2020-01-21 RX ADMIN — Medication SCH MG: at 22:10

## 2020-01-21 RX ADMIN — Medication SCH TAB: at 10:18

## 2020-01-21 NOTE — PN
[facility] COWS





- Scale


Resting Pulse: 0= [place] 80 or Below


Sweatin= Chills/Flushing


Restless Observation: 1= Difficult to Sit Still


Pupil Size: 0= Normal to Room Light


Bone or Joint Aches: 2= Severe Diffuse Aches


Runny Nose/ Eye Tearin= Nasal Congestion


GI Upset > 30mins: 0= None


Tremor Observation of Outstretched Hands: 1= Tremor Felt, Not Seen


Yawning Observation: 1= 1-2x During Session


Anxiety or Irritability: 2=Irritable/Anxious


Goose Flesh Skin: 0=Smooth Skin


COWS Score: 9





[facility] Progress Note (SOAP)


Subjective: 





irritable


agitation


sweats


restless





Objective: 





20 09:36


 Vital Signs











Temperature  97.7 F   20 09:25


 


Pulse Rate  70   20 09:25


 


Respiratory Rate  16   20 09:25


 


Blood Pressure  137/79   20 09:25


 


O2 Sat by Pulse Oximetry (%)      








 Laboratory Tests











  20





  07:25 07:25 07:25


 


WBC   6.4 


 


RBC   4.36 


 


Hgb   13.1 


 


Hct   39.6 


 


MCV   90.8 


 


MCH   30.1 


 


MCHC   33.2 


 


RDW   14.1 


 


Plt Count   236 


 


MPV   9.7 


 


Sodium    141


 


Potassium    4.0


 


Chloride    107


 


Carbon Dioxide    30


 


Anion Gap    4 L


 


BUN    12.0


 


Creatinine    0.9


 


Est GFR (CKD-EPI)AfAm    111.05


 


Est GFR (CKD-EPI)NonAf    95.81


 


Random Glucose    85


 


Calcium    8.7


 


Total Bilirubin    0.2


 


AST    7 L


 


ALT    16


 


Alkaline Phosphatase    59


 


Total Protein    6.2 L


 


Albumin    3.2 L


 


RPR Titer   


 


HIV 1&2 Antibody Screen  Negative  


 


HIV P24 Antigen  Negative  














  20





  07:25


 


WBC 


 


RBC 


 


Hgb 


 


Hct 


 


MCV 


 


MCH 


 


MCHC 


 


RDW 


 


Plt Count 


 


MPV 


 


Sodium 


 


Potassium 


 


Chloride 


 


Carbon Dioxide 


 


Anion Gap 


 


BUN 


 


Creatinine 


 


Est GFR (CKD-EPI)AfAm 


 


Est GFR (CKD-EPI)NonAf 


 


Random Glucose 


 


Calcium 


 


Total Bilirubin 


 


AST 


 


ALT 


 


Alkaline Phosphatase 


 


Total Protein 


 


Albumin 


 


RPR Titer  Nonreactive


 


HIV 1&2 Antibody Screen 


 


HIV P24 Antigen 








[id number]


ambulating


no acute distress


Assessment: 





20 09:36


withdrawals


Plan: 





continue detox

## 2020-01-22 RX ADMIN — Medication SCH: at 22:31

## 2020-01-22 RX ADMIN — Medication SCH TAB: at 10:30

## 2020-01-22 NOTE — PN
BHS COWS





- Scale


Resting Pulse: 1= RI 


Sweatin= Chills/Flushing


Restless Observation: 1= Difficult to Sit Still


Pupil Size: 0= Normal to Room Light


Bone or Joint Aches: 0= None


Runny Nose/ Eye Tearin= None


GI Upset > 30mins: 0= None


Tremor Observation of Outstretched Hands: 1= Tremor Felt, Not Seen


Yawning Observation: 1= 1-2x During Session


Anxiety or Irritability: 1=Feels Anxious/Irritable


Goose Flesh Skin: 0=Smooth Skin


COWS Score: 6





BHS Progress Note (SOAP)


Subjective: 





sweats


agitation





Objective: 





20 12:01


 Vital Signs











Temperature  98.1 F   20 09:13


 


Pulse Rate  81   20 09:13


 


Respiratory Rate  18   20 09:13


 


Blood Pressure  129/80   20 09:13


 


O2 Sat by Pulse Oximetry (%)      











aaox3


ambulating


no acute distress





Assessment: 





20 12:02


withdrawals


Plan: 





continue detox

## 2020-01-23 RX ADMIN — Medication SCH: at 23:09

## 2020-01-23 RX ADMIN — Medication SCH TAB: at 10:05

## 2020-01-23 NOTE — PN
[facility]HS COWS





- Scale


Resting Pulse: 1= HI 


Sweatin= No chills or Flushing


Restless Observation: 1= Difficult to Sit Still


Pupil Size: 0= Normal to Room Light


Bone or Joint Aches: 1= Mild Discomfort


Runny Nose/ Eye Tearin= None


GI Upset > 30mins: 0= None


Tremor Observation of Outstretched Hands: 1= Tremor Felt, Not Seen


Yawning Observation: 0= None


Anxiety or Irritability: 2=Irritable/Anxious


Goose Flesh Skin: 0=Smooth Skin


COWS Score: 6





BHS Progress Note (SOAP)


Subjective: 





Patient is a 55 yo male with hx of opioid dependence c/o of chills, anxious 

interrupted sleep. Patient is motivated to go to rehab for after care, MAT 

reviewed , educated on benefits and risks MAT therapy including MMTP. Patient 

reports prior tx with suboxone prior to relapse and plasns to resume bup 

therapy while in rehab. 


Objective: 





20 09:02


 Vital Signs











Temperature  98.0 F   20 06:39


 


Pulse Rate  65   20 07:41


 


Respiratory Rate  18   20 06:39


 


Blood Pressure  142/91   20 07:41


 


O2 Sat by Pulse Oximetry (%)      








 Laboratory Last Values











WBC  6.4 K/mm3 (4.0-10.0)   20  07:25    


 


RBC  4.36 M/mm3 (4.00-5.60)   20  07:25    


 


Hgb  13.1 GM/dL (11.7-16.9)   20  07:25    


 


Hct  39.6 % (35.4-49)   20  07:25    


 


MCV  90.8 fl (80-96)   20  07:25    


 


MCH  30.1 pg (25.7-33.7)   20  07:25    


 


MCHC  33.2 g/dl (32.0-35.9)   20  07:25    


 


RDW  14.1 % (11.9-15.9)   20  07:25    


 


Plt Count  236 K/MM3 (134-434)   20  07:25    


 


MPV  9.7 fl (7.5-11.1)   20  07:25    


 


Sodium  141 mmol/L (136-145)   20  07:25    


 


Potassium  4.0 mmol/L (3.5-5.1)   20  07:25    


 


Chloride  107 mmol/L ()   20  07:25    


 


Carbon Dioxide  30 mmol/L (21-32)   20  07:25    


 


Anion Gap  4 MMOL/L (8-16)  L  20  07:25    


 


BUN  12.0 mg/dL (7-18)   20  07:25    


 


Creatinine  0.9 mg/dL (0.55-1.3)   20  07:25    


 


Est GFR (CKD-EPI)AfAm  111.05   20  07:25    


 


Est GFR (CKD-EPI)NonAf  95.81   20  07:25    


 


Random Glucose  85 mg/dL ()   20  07:25    


 


Calcium  8.7 mg/dL (8.5-10.1)   20  07:25    


 


Total Bilirubin  0.2 mg/dL (0.2-1)   20  07:25    


 


AST  7 U/L (15-37)  L  20  07:25    


 


ALT  16 U/L (13-61)   20  07:25    


 


Alkaline Phosphatase  59 U/L ()   20  07:25    


 


Total Protein  6.2 g/dl (6.4-8.2)  L  20  07:25    


 


Albumin  3.2 g/dl (3.4-5.0)  L  20  07:25    


 


RPR Titer  Nonreactive  (NONREACTIVE)   20  07:25    


 


HIV 1&2 Antibody Screen  Negative   20  07:25    


 


HIV P24 Antigen  Negative   20  07:25    











Assessment: 





20 11:51


 Vital Signs











Temperature  99.3 F   20 10:57


 


Pulse Rate  89   20 10:57


 


Respiratory Rate  18   20 10:57


 


Blood Pressure  164/87   20 10:57


 


O2 Sat by Pulse Oximetry (%)      








 Laboratory Last Values











WBC  6.4 K/mm3 (4.0-10.0)   20  07:25    


 


RBC  4.36 M/mm3 (4.00-5.60)   20  07:25    


 


Hgb  13.1 GM/dL (11.7-16.9)   20  07:25    


 


Hct  39.6 % (35.4-49)   20  07:25    


 


MCV  90.8 fl (80-96)   20  07:25    


 


MCH  30.1 pg (25.7-33.7)   20  07:25    


 


MCHC  33.2 g/dl (32.0-35.9)   20  07:25    


 


RDW  14.1 % (11.9-15.9)   20  07:25    


 


Plt Count  236 K/MM3 (134-434)   20  07:25    


 


MPV  9.7 fl (7.5-11.1)   20  07:25    


 


Sodium  141 mmol/L (136-145)   20  07:25    


 


Potassium  4.0 mmol/L (3.5-5.1)   20  07:25    


 


Chloride  107 mmol/L ()   20  07:25    


 


Carbon Dioxide  30 mmol/L (21-32)   20  07:25    


 


Anion Gap  4 MMOL/L (8-16)  L  20  07:25    


 


BUN  12.0 mg/dL (7-18)   20  07:25    


 


Creatinine  0.9 mg/dL (0.55-1.3)   20  07:25    


 


Est GFR (CKD-EPI)AfAm  111.05   20  07:25    


 


Est GFR (CKD-EPI)NonAf  95.81   20  07:25    


 


Random Glucose  85 mg/dL ()   20  07:25    


 


Calcium  8.7 mg/dL (8.5-10.1)   20  07:25    


 


Total Bilirubin  0.2 mg/dL (0.2-1)   20  07:25    


 


AST  7 U/L (15-37)  L  20  07:25    


 


ALT  16 U/L (13-61)   20  07:25    


 


Alkaline Phosphatase  59 U/L ()   20  07:25    


 


Total Protein  6.2 g/dl (6.4-8.2)  L  20  07:25    


 


Albumin  3.2 g/dl (3.4-5.0)  L  20  07:25    


 


RPR Titer  Nonreactive  (NONREACTIVE)   20  07:25    


 


HIV 1&2 Antibody Screen  Negative   20  07:25    


 


HIV P24 Antigen  Negative   20  07:25    











labs reviewed


Plan: 





Continue detox 


Continue to monitor

## 2020-01-24 VITALS — HEART RATE: 86 BPM | SYSTOLIC BLOOD PRESSURE: 146 MMHG | TEMPERATURE: 97.9 F | DIASTOLIC BLOOD PRESSURE: 63 MMHG

## 2020-02-18 ENCOUNTER — HOSPITAL ENCOUNTER (INPATIENT)
Dept: HOSPITAL 74 - YASAS | Age: 55
LOS: 3 days | Discharge: HOME | DRG: 773 | End: 2020-02-21
Attending: ALLERGY & IMMUNOLOGY | Admitting: ALLERGY & IMMUNOLOGY
Payer: COMMERCIAL

## 2020-02-18 VITALS — BODY MASS INDEX: 21.3 KG/M2

## 2020-02-18 DIAGNOSIS — I25.2: ICD-10-CM

## 2020-02-18 DIAGNOSIS — Z91.013: ICD-10-CM

## 2020-02-18 DIAGNOSIS — F41.8: ICD-10-CM

## 2020-02-18 DIAGNOSIS — I10: ICD-10-CM

## 2020-02-18 DIAGNOSIS — R56.9: ICD-10-CM

## 2020-02-18 DIAGNOSIS — F10.230: ICD-10-CM

## 2020-02-18 DIAGNOSIS — Z59.0: ICD-10-CM

## 2020-02-18 DIAGNOSIS — F32.9: ICD-10-CM

## 2020-02-18 DIAGNOSIS — Z91.018: ICD-10-CM

## 2020-02-18 DIAGNOSIS — E78.00: ICD-10-CM

## 2020-02-18 DIAGNOSIS — F12.20: ICD-10-CM

## 2020-02-18 DIAGNOSIS — F11.23: Primary | ICD-10-CM

## 2020-02-18 DIAGNOSIS — F17.210: ICD-10-CM

## 2020-02-18 DIAGNOSIS — I25.10: ICD-10-CM

## 2020-02-18 LAB
ALBUMIN SERPL-MCNC: 3.8 G/DL (ref 3.4–5)
ALP SERPL-CCNC: 79 U/L (ref 45–117)
ALT SERPL-CCNC: 15 U/L (ref 13–61)
ANION GAP SERPL CALC-SCNC: 5 MMOL/L (ref 8–16)
AST SERPL-CCNC: 10 U/L (ref 15–37)
BILIRUB SERPL-MCNC: 0.3 MG/DL (ref 0.2–1)
BUN SERPL-MCNC: 11.8 MG/DL (ref 7–18)
CALCIUM SERPL-MCNC: 9 MG/DL (ref 8.5–10.1)
CHLORIDE SERPL-SCNC: 106 MMOL/L (ref 98–107)
CO2 SERPL-SCNC: 29 MMOL/L (ref 21–32)
CREAT SERPL-MCNC: 0.7 MG/DL (ref 0.55–1.3)
DEPRECATED RDW RBC AUTO: 13.5 % (ref 11.9–15.9)
GLUCOSE SERPL-MCNC: 132 MG/DL (ref 74–106)
HCT VFR BLD CALC: 37.5 % (ref 35.4–49)
HGB BLD-MCNC: 12.5 GM/DL (ref 11.7–16.9)
MCH RBC QN AUTO: 30.8 PG (ref 25.7–33.7)
MCHC RBC AUTO-ENTMCNC: 33.3 G/DL (ref 32–35.9)
MCV RBC: 92.4 FL (ref 80–96)
PLATELET # BLD AUTO: 244 K/MM3 (ref 134–434)
PMV BLD: 9.9 FL (ref 7.5–11.1)
POTASSIUM SERPLBLD-SCNC: 3.9 MMOL/L (ref 3.5–5.1)
PROT SERPL-MCNC: 7 G/DL (ref 6.4–8.2)
RBC # BLD AUTO: 4.06 M/MM3 (ref 4–5.6)
SODIUM SERPL-SCNC: 139 MMOL/L (ref 136–145)
WBC # BLD AUTO: 9.4 K/MM3 (ref 4–10)

## 2020-02-18 PROCEDURE — HZ2ZZZZ DETOXIFICATION SERVICES FOR SUBSTANCE ABUSE TREATMENT: ICD-10-PCS | Performed by: ALLERGY & IMMUNOLOGY

## 2020-02-18 RX ADMIN — Medication SCH MG: at 22:41

## 2020-02-18 RX ADMIN — NICOTINE SCH: 21 PATCH TRANSDERMAL at 12:48

## 2020-02-18 SDOH — ECONOMIC STABILITY - HOUSING INSECURITY: HOMELESSNESS: Z59.0

## 2020-02-18 NOTE — BHS.RME
Substance Use & Tx History





- Substance Use History


  ** Opiates (Heroin)


Substance amount: 4=7 bags


Frequency of use: Daily


Substance route: Inhalation (ex: sniffing or snorting)


Date of Last Use: 20 (6am)





  ** Nicotine


Substance amount: 1 pack


Frequency of use: Daily


Substance route: Smoking


Date of Last Use: 20





Physical/Psych/Mental Status





- Behavior


General Behavior: Increased activity (restlessness, agitation)


Eye Contact: Normal





- Cooperativeness


Cooperativeness: Cooperative





- Thinking


Thought Processes: Tight, Logical, Goal Directed


Thought content: Future oriented





- Physical Health Problems


Is patient presently having any pain?: No


Does patient presently have any injuries (include location): No


Does patient currently have a fever: No


Is patient pregnant: No





COWS





- Scale


Resting Pulse: 0= OR 80 or Below


Sweatin= No chills or Flushing


Restless Observation: 1= Difficult to Sit Still


Pupil Size: 0= Normal to Room Light


Bone or Joint Aches: 4=Acute Joint/Muscle Pain


Runny Nose/ Eye Tearin= Nasal Congestion


GI Upset > 30mins: 1= Stomach Cramp


Tremor Observation: 1= Tremor Felt, Not Seen


Yawning Observation: 1= 1-2x During Session


Anxiety or Irritability: 2=Irritable/Anxious


Goose Flesh Skin: 0=Smooth Skin (just used heroin this morning)


COWS Score: 11

## 2020-02-18 NOTE — HP
COWS





- Scale


Resting Pulse: 0= SC 80 or Below


Sweatin= No chills or Flushing


Restless Observation: 1= Difficult to Sit Still


Pupil Size: 0= Normal to Room Light


Bone or Joint Aches: 4=Acute Joint/Muscle Pain


Runny Nose/ Eye Tearin= Nasal Congestion


GI Upset > 30mins: 1= Stomach Cramp


Tremor Observation: 1= Tremor Felt, Not Seen


Yawning Observation: 1= 1-2x During Session


Anxiety or Irritability: 2=Irritable/Anxious


Goose Flesh Skin: 0=Smooth Skin (just used heroin this morning)


COWS Score: 11





CIWA Score





- Admission Criteria


[facility] Guidelines: Admission for Medically Managed Detox: 


Requires at least one of the followin. CIWA greater than 12


2. Seizures within the past 24 hours


3. Delirium tremens within the past 24 hours


4. Hallucinations within the past 24 hours


5. Acute intervention needed for co  occurring medical disorder


6. Acute intervention needed for co  occurring psychiatric disorder


7. Severe withdrawal that cannot be handled at a lower level of care (continued


    vomiting, continued diarrhea, abnormal vital signs) requiring intravenous


    medication and/or fluids


8. Pregnancy








Admitting History and Physical





- Admission


Chief Complaint: Mr. Garcia is a 53 yo gentleman who presents to [facility] 

"because I want to get better" from heroin use.


History of Present Illness: 





Mr. Garcia is a 53 yo gentleman who presents to [facility] "because I want to 

get better" from heroin use.  He as last here in January but left early due to 

a conflict with a patient. 





PMH: MI at the age of 41 yo, CAD, HTN, HLD, seizure 4 mos ago while intoxicated


Psych: depression on no meds


Surg: none





Substance use history: 


Heroine: first use at the age of 13y, last use 6 am today, 4-7 bags per day, 

inhales, street methadone 3 days ago.  Overdose 4-5 times, last one 8 months 

ago. Not in a methadone program.  Tried Suboxone but left 2019.  [facility]. 


cigs: 5-20 per day





- Smoking History


Smoking history: Current every day smoker


Have you smoked in the past 12 months: Yes


Aproximately how many cigarettes per day: 20





- Alcohol/Substance Use


Hx Alcohol Use: No





Admission ROS S





- HPI


Allergies/Adverse Reactions: 


 Allergies











Allergy/AdvReac Type Severity Reaction Status Date / Time


 


shellfish derived Allergy Severe Swelling Verified 20 11:16


 


tomato Allergy Severe Swelling Verified 20 11:16


 


Fish Containing Products Allergy Mild Rash Verified 20 11:16


 


No Known Drug Allergies Allergy   Verified 20 11:16


 


cheese AdvReac Intermediate Swelling Verified 20 11:16











Exam Limitations: No Limitations





- Ebola screening


Have you traveled outside of the country in the last 21 days: No


Have you had contact with anyone from an Ebola affected area: No


Have you been sick,other than usual withdrawal symptoms: No


Do you have a fever: No





- Review of Systems


Constitutional: Unintentional Wgt. Loss (20 lb loss in 4 mos)


EENT: reports: Blurred Vision


Respiratory: reports: No Symptoms reported


Cardiac: reports: No Symptoms Reported


GI: reports: Nausea


: reports: No Symptoms Reported


Musculoskeletal: reports: Back Pain, Joint Pain, Muscle Pain


Integumentary: reports: Other (left MCP are scrape after retrieving cans from 

garbage)


Neuro: reports: Tremors


Endocrine: reports: No Symptoms Reported


Hematology: reports: No Symptoms Reported


Psychiatric: reports: Anxious





Patient History





- Patient Medical History


Hx Anemia: No


Hx Asthma: No


Hx Chronic Obstructive Pulmonary Disease (COPD): No


Hx Cancer: No


Hx Cardiac Disorders: Yes (old MI at age 40)


Hx Congestive Heart Failure: No


Hx Hypertension: Yes (no medications)


Hx Hypercholesterolemia: Yes (non adherent)


Hx Pacemaker: No


HX Cerebrovascular Accident: No


Hx Seizures: Yes (drug r/t seizure last attack 2 months ago)


Hx Dementia: No


Hx Diabetes: No


Hx Gastrointestinal Disorders: No


Hx Liver Disease: No


Hx Genitourinary Disorders: No


Hx Sexually Transmitted Disorders: No


Hx Renal Disease (ESRD): No


Hx Thyroid Disease: No


Hx Human Immunodeficiency Virus (HIV): No (Last Tested:  18 NEGATIVE)


Hx Hepatitis C: No


Hx Depression: Yes (AND ANXIETY)


Hx Suicide Attempt: No


Hx Bipolar Disorder: No


Hx Schizophrenia: No





- Patient Surgical History


Past Surgical History: No


Hx Neurologic Surgery: No


Hx Cataract Extraction: No


Hx Cardiac Surgery: No


Hx Lung Surgery: No


Hx Breast Surgery: No


Hx Breast Biopsy: No


Hx Abdominal Surgery: No


Hx Appendectomy: No


Hx Cholecystectomy: No


Hx Genitourinary Surgery: No


Hx  Section: No


Hx Orthopedic Surgery: No


Anesthesia Reaction: No





- PPD History


Previous Implant?: Yes


Documented Results: Negative w/proof


Implanted On Prior [facility] Admission?: Yes


Date: 10/29/18


Results: negative





- Smoking Cessation


Smoking history: Current every day smoker


Have you smoked in the past 12 months: Yes


Aproximately how many cigarettes per day: 20


Cigars Per Day: 0


Hx Chewing Tobacco Use: No


Initiated information on smoking cessation: Yes


'Breaking Loose' booklet given: 20





- Substances abused


  ** Heroin


Substance route: Inhalation


Frequency: Daily


Amount used: 5-7 bags


Age of first use: 13


Date of last use: 20





Admission Physical Exam B[facility]





- Vital Signs


Vital Signs: 


 Vital Signs - 24 hr











  20





  11:17


 


Temperature 97.2 F L


 


Pulse Rate 87


 


Respiratory 18





Rate 


 


Blood Pressure 167/89














- Physical


General Appearance: Yes: Mild Distress, Thin, Anxious


HEENTM: Yes: EOMI, Hearing grossly Normal, Normocephalic


Respiratory: Yes: Lungs Clear, Normal Breath Sounds


Neck: Yes: Within Normal Limits


Breast: Yes: Breast Exam Deferred


Cardiology: Yes: Regular Rate, S1, S2


Abdominal: Yes: Flat, Soft, Decreased BS, Tenderness (slight, diffuse)


Genitourinary: Yes: Other (deferred)


Back: Yes: Normal Inspection


Musculoskeletal: Yes: Within Normal Limits


Extremities: Yes: Within Normal Limits


Neurological: Yes: Fully Oriented, Normal Response


Integumentary: Yes: Other (superficial abrasion left D3 MCP area, ~ 1" in length

, no signs of infection)


Lymphatic: Yes: Within Normal Limits





Cleared for Admission [facility]





- Detox or Rehab


[facility] Level of Care: Medically Managed





Breathalyzer





- Breathalyzer


Breathalyzer: 0





Urine Drug Screen





- Test Device


Lot number: [id number]


Expiration date: 21





- Control


Is test valid?: Yes





- Results


Drug screen NEGATIVE: No


Urine drug screen results: FEN-Fentanyl, MOP-Opiates, MTD-Methadone





Inpatient Rehab Admission





- Rehab Decision to Admit


Inpatient rehab admission?: No

## 2020-02-19 RX ADMIN — Medication SCH TAB: at 10:17

## 2020-02-19 RX ADMIN — NICOTINE SCH: 21 PATCH TRANSDERMAL at 10:17

## 2020-02-19 NOTE — PN
BHS COWS





- Scale


Resting Pulse: 0= ME 80 or Below


Sweatin= No chills or Flushing


Restless Observation: 1= Difficult to Sit Still


Pupil Size: 1= Pupils >than Normal


Bone or Joint Aches: 1= Mild Discomfort


Runny Nose/ Eye Tearin= Nasal Congestion


GI Upset > 30mins: 2= Nausea/Diarrhea


Tremor Observation of Outstretched Hands: 2= Slight Tremor Visible


Yawning Observation: 1= 1-2x During Session


Anxiety or Irritability: 2=Irritable/Anxious


Goose Flesh Skin: 0=Smooth Skin


COWS Score: 11





BHS Progress Note (SOAP)


Subjective: 





alert,irritable,anxious,interrupted sleep,pain in the body and back


Objective: 





20 11:05


 Vital Signs











Temperature  97.8 F   20 08:45


 


Pulse Rate  59 L  20 08:45


 


Respiratory Rate  18   20 08:45


 


Blood Pressure  153/82   20 08:45


 


O2 Sat by Pulse Oximetry (%)      








 Laboratory Last Values











WBC  9.4 K/mm3 (4.0-10.0)   20  12:00    


 


RBC  4.06 M/mm3 (4.00-5.60)   20  12:00    


 


Hgb  12.5 GM/dL (11.7-16.9)   20  12:00    


 


Hct  37.5 % (35.4-49)   20  12:00    


 


MCV  92.4 fl (80-96)   20  12:00    


 


MCH  30.8 pg (25.7-33.7)   20  12:00    


 


MCHC  33.3 g/dl (32.0-35.9)   20  12:00    


 


RDW  13.5 % (11.9-15.9)   20  12:00    


 


Plt Count  244 K/MM3 (134-434)   20  12:00    


 


MPV  9.9 fl (7.5-11.1)   20  12:00    


 


Sodium  139 mmol/L (136-145)   20  12:00    


 


Potassium  3.9 mmol/L (3.5-5.1)   20  12:00    


 


Chloride  106 mmol/L ()   20  12:00    


 


Carbon Dioxide  29 mmol/L (21-32)   20  12:00    


 


Anion Gap  5 MMOL/L (8-16)  L  20  12:00    


 


BUN  11.8 mg/dL (7-18)   20  12:00    


 


Creatinine  0.7 mg/dL (0.55-1.3)   20  12:00    


 


Est GFR (CKD-EPI)AfAm  123.13   20  12:00    


 


Est GFR (CKD-EPI)NonAf  106.24   20  12:00    


 


Random Glucose  132 mg/dL ()  H  20  12:00    


 


Calcium  9.0 mg/dL (8.5-10.1)   20  12:00    


 


Total Bilirubin  0.3 mg/dL (0.2-1)   20  12:00    


 


AST  10 U/L (15-37)  L  20  12:00    


 


ALT  15 U/L (13-61)   20  12:00    


 


Alkaline Phosphatase  79 U/L ()   20  12:00    


 


Total Protein  7.0 g/dl (6.4-8.2)   20  12:00    


 


Albumin  3.8 g/dl (3.4-5.0)   20  12:00    


 


RPR Titer  Nonreactive  (NONREACTIVE)   20  12:00    


 


HIV 1&2 Antibody Screen  Cancelled   20  11:20    


 


HIV P24 Antigen  Cancelled   20  11:20    











Assessment: 





20 11:05


withdrawal symptom


Plan: 





continue detox methadone regimen,initial glucose is 132,fasting glucose in am

## 2020-02-20 VITALS — TEMPERATURE: 97.9 F

## 2020-02-20 RX ADMIN — Medication SCH: at 00:02

## 2020-02-20 RX ADMIN — NICOTINE SCH: 21 PATCH TRANSDERMAL at 11:12

## 2020-02-20 RX ADMIN — Medication SCH: at 22:43

## 2020-02-20 RX ADMIN — Medication SCH TAB: at 11:12

## 2020-02-20 NOTE — PN
BHS COWS





- Scale


Resting Pulse: 0= IN 80 or Below


Sweatin= No chills or Flushing


Restless Observation: 1= Difficult to Sit Still


Pupil Size: 0= Normal to Room Light


Bone or Joint Aches: 1= Mild Discomfort


Runny Nose/ Eye Tearin= Nasal Congestion


GI Upset > 30mins: 1= Stomach Cramp


Tremor Observation of Outstretched Hands: 1= Tremor Felt, Not Seen


Yawning Observation: 1= 1-2x During Session


Anxiety or Irritability: 1=Feels Anxious/Irritable


Goose Flesh Skin: 0=Smooth Skin


COWS Score: 7





BHS Progress Note (SOAP)


Subjective: 





alert,irritable,anxious,interrupted sleep,pain in the body and back


Objective: 





20 10:07


 Vital Signs











Temperature  97.7 F   20 08:33


 


Pulse Rate  63   20 08:33


 


Respiratory Rate  16   20 08:33


 


Blood Pressure  133/77   20 08:33


 


O2 Sat by Pulse Oximetry (%)      











20 10:07


 Laboratory Results - last 24 hr











  20





  11:25 07:30


 


Fasting Glucose   89


 


HIV 1&2 Ag/Ab, 4th Gen  Non reactive 











Assessment: 





20 10:07


withdrawal symptom but less


Plan: 





continue detox methadone regimen,adjust,for discharge in am

## 2020-02-21 VITALS — DIASTOLIC BLOOD PRESSURE: 85 MMHG | SYSTOLIC BLOOD PRESSURE: 129 MMHG | HEART RATE: 56 BPM

## 2020-02-21 NOTE — DS
BHS Detox Discharge Summary


Admission Date: 


02/18/20








- History


Present History: Alcohol Dependence, Cannabis Dependence, Opioid Dependence


Additional Comments: 





alert,oriented x 3


ambulation on the unit


heart normal heart sound


lung clear


no abdominal pain


stable for discharge


follow up with after care program as arrangement and medical provider


discharge time spending 35 mins








Pertinent Past History: 





hypertensioo


hypercholesterolemia





- Physical Exam Results


Vital Signs: 


 Vital Signs











Temperature  97.9 F   02/21/20 06:20


 


Pulse Rate  56 L  02/21/20 06:20


 


Respiratory Rate  18   02/21/20 06:20


 


Blood Pressure  129/85   02/21/20 06:20


 


O2 Sat by Pulse Oximetry (%)      











Pertinent Admission Physical Exam Findings: 





withdrawal signs and symptom


 Vital Signs











Temperature  97.9 F   02/21/20 06:20


 


Pulse Rate  56 L  02/21/20 06:20


 


Respiratory Rate  18   02/21/20 06:20


 


Blood Pressure  129/85   02/21/20 06:20


 


O2 Sat by Pulse Oximetry (%)      








 Laboratory Last Values











WBC  9.4 K/mm3 (4.0-10.0)   02/18/20  12:00    


 


RBC  4.06 M/mm3 (4.00-5.60)   02/18/20  12:00    


 


Hgb  12.5 GM/dL (11.7-16.9)   02/18/20  12:00    


 


Hct  37.5 % (35.4-49)   02/18/20  12:00    


 


MCV  92.4 fl (80-96)   02/18/20  12:00    


 


MCH  30.8 pg (25.7-33.7)   02/18/20  12:00    


 


MCHC  33.3 g/dl (32.0-35.9)   02/18/20  12:00    


 


RDW  13.5 % (11.9-15.9)   02/18/20  12:00    


 


Plt Count  244 K/MM3 (134-434)   02/18/20  12:00    


 


MPV  9.9 fl (7.5-11.1)   02/18/20  12:00    


 


Sodium  139 mmol/L (136-145)   02/18/20  12:00    


 


Potassium  3.9 mmol/L (3.5-5.1)   02/18/20  12:00    


 


Chloride  106 mmol/L ()   02/18/20  12:00    


 


Carbon Dioxide  29 mmol/L (21-32)   02/18/20  12:00    


 


Anion Gap  5 MMOL/L (8-16)  L  02/18/20  12:00    


 


BUN  11.8 mg/dL (7-18)   02/18/20  12:00    


 


Creatinine  0.7 mg/dL (0.55-1.3)   02/18/20  12:00    


 


Est GFR (CKD-EPI)AfAm  123.13   02/18/20  12:00    


 


Est GFR (CKD-EPI)NonAf  106.24   02/18/20  12:00    


 


Random Glucose  132 mg/dL ()  H  02/18/20  12:00    


 


Fasting Glucose  89 mg/dL ()   02/20/20  07:30    


 


Calcium  9.0 mg/dL (8.5-10.1)   02/18/20  12:00    


 


Total Bilirubin  0.3 mg/dL (0.2-1)   02/18/20  12:00    


 


AST  10 U/L (15-37)  L  02/18/20  12:00    


 


ALT  15 U/L (13-61)   02/18/20  12:00    


 


Alkaline Phosphatase  79 U/L ()   02/18/20  12:00    


 


Total Protein  7.0 g/dl (6.4-8.2)   02/18/20  12:00    


 


Albumin  3.8 g/dl (3.4-5.0)   02/18/20  12:00    


 


RPR Titer  Nonreactive  (NONREACTIVE)   02/18/20  12:00    


 


HIV 1&2 Ag/Ab, 4th Gen  Non reactive  (Non Reactive)   02/18/20  11:25    


 


HIV 1&2 Antibody Screen  Cancelled   02/18/20  11:20    


 


HIV P24 Antigen  Cancelled   02/18/20  11:20    














- Treatment


Hospital Course: Detox Protocol Followed, Detoxed Safely, Responded well, 

Discharged Condition Good


Patient has Accepted a Rehab Referral to: declined





- Medication


Discharge Medications: 


Ambulatory Orders





NK [No Known Home Medication]  01/19/20 











- Diagnosis


(1) Alcohol dependence


Current Visit: No   Status: Acute   





(2) Nicotine dependence, uncomplicated


Current Visit: No   Status: Acute   


Qualifiers: 


   Nicotine product type: cigarettes   Qualified Code(s): F17.210 - Nicotine 

dependence, cigarettes, uncomplicated   





(3) Opioid dependence, uncomplicated


Current Visit: No   Status: Acute   





(4) Cannabis dependence


Current Visit: No   Status: Chronic   





(5) Hypertension


Current Visit: No   Status: Chronic   


Qualifiers: 


   Hypertension type: essential hypertension   Qualified Code(s): I10 - 

Essential (primary) hypertension   





(6) History of hypercholesterolemia


Current Visit: No   Status: Suspected   





(7) History of MI (myocardial infarction)


Current Visit: No   Status: Resolved   





- AMA


Did Patient Leave Against Medical Advice: No

## 2020-02-21 NOTE — PN
BHS COWS





- Scale


Resting Pulse: 0= IN 80 or Below


Sweatin= No chills or Flushing


Restless Observation: 0= Sits Still


Pupil Size: 0= Normal to Room Light


Bone or Joint Aches: 1= Mild Discomfort


Runny Nose/ Eye Tearin= None


GI Upset > 30mins: 0= None


Tremor Observation of Outstretched Hands: 0= None


Yawning Observation: 0= None


Anxiety or Irritability: 1=Feels Anxious/Irritable


Goose Flesh Skin: 0=Smooth Skin


COWS Score: 2





BHS Progress Note (SOAP)


Subjective: 





alert,no complaint


Objective: 





20 08:23


 Vital Signs











Temperature  97.9 F   20 06:20


 


Pulse Rate  56 L  20 06:20


 


Respiratory Rate  18   20 06:20


 


Blood Pressure  129/85   20 06:20


 


O2 Sat by Pulse Oximetry (%)      











Assessment: 





20 08:24


no withdrawal symptom


Plan: 





stable for discharge today,follow  up with after care program as arrangement

## 2021-03-09 ENCOUNTER — HOSPITAL ENCOUNTER (INPATIENT)
Dept: HOSPITAL 74 - YASAS | Age: 56
LOS: 3 days | Discharge: LEFT BEFORE DISCHARGE | DRG: 770 | End: 2021-03-12
Attending: ALLERGY & IMMUNOLOGY | Admitting: ALLERGY & IMMUNOLOGY
Payer: COMMERCIAL

## 2021-03-09 VITALS — BODY MASS INDEX: 21.1 KG/M2

## 2021-03-09 DIAGNOSIS — F19.24: ICD-10-CM

## 2021-03-09 DIAGNOSIS — Z62.810: ICD-10-CM

## 2021-03-09 DIAGNOSIS — F19.282: ICD-10-CM

## 2021-03-09 DIAGNOSIS — F10.230: ICD-10-CM

## 2021-03-09 DIAGNOSIS — F41.8: ICD-10-CM

## 2021-03-09 DIAGNOSIS — Z91.018: ICD-10-CM

## 2021-03-09 DIAGNOSIS — Z91.013: ICD-10-CM

## 2021-03-09 DIAGNOSIS — F12.20: ICD-10-CM

## 2021-03-09 DIAGNOSIS — F17.210: ICD-10-CM

## 2021-03-09 DIAGNOSIS — G47.00: ICD-10-CM

## 2021-03-09 DIAGNOSIS — E78.00: ICD-10-CM

## 2021-03-09 DIAGNOSIS — I25.2: ICD-10-CM

## 2021-03-09 DIAGNOSIS — F32.9: ICD-10-CM

## 2021-03-09 DIAGNOSIS — Z56.0: ICD-10-CM

## 2021-03-09 DIAGNOSIS — F11.23: Primary | ICD-10-CM

## 2021-03-09 DIAGNOSIS — Z59.0: ICD-10-CM

## 2021-03-09 DIAGNOSIS — I10: ICD-10-CM

## 2021-03-09 LAB
ALBUMIN SERPL-MCNC: 3.4 G/DL (ref 3.4–5)
ALP SERPL-CCNC: 88 U/L (ref 45–117)
ALT SERPL-CCNC: 17 U/L (ref 13–61)
ANION GAP SERPL CALC-SCNC: 4 MMOL/L (ref 8–16)
AST SERPL-CCNC: 8 U/L (ref 15–37)
BILIRUB SERPL-MCNC: 0.2 MG/DL (ref 0.2–1)
BUN SERPL-MCNC: 10.2 MG/DL (ref 7–18)
CALCIUM SERPL-MCNC: 9.1 MG/DL (ref 8.5–10.1)
CHLORIDE SERPL-SCNC: 106 MMOL/L (ref 98–107)
CO2 SERPL-SCNC: 28 MMOL/L (ref 21–32)
CREAT SERPL-MCNC: 0.8 MG/DL (ref 0.55–1.3)
DEPRECATED RDW RBC AUTO: 13.3 % (ref 11.9–15.9)
GLUCOSE SERPL-MCNC: 112 MG/DL (ref 74–106)
HCT VFR BLD CALC: 39.6 % (ref 35.4–49)
HGB BLD-MCNC: 13.3 GM/DL (ref 11.7–16.9)
HIV 1+2 AB+HIV1 P24 AG SERPL QL IA: NEGATIVE
MCH RBC QN AUTO: 30.3 PG (ref 25.7–33.7)
MCHC RBC AUTO-ENTMCNC: 33.5 G/DL (ref 32–35.9)
MCV RBC: 90.4 FL (ref 80–96)
PLATELET # BLD AUTO: 316 K/MM3 (ref 134–434)
PMV BLD: 9.4 FL (ref 7.5–11.1)
POTASSIUM SERPLBLD-SCNC: 4.4 MMOL/L (ref 3.5–5.1)
PROT SERPL-MCNC: 7.2 G/DL (ref 6.4–8.2)
RBC # BLD AUTO: 4.38 M/MM3 (ref 4–5.6)
SODIUM SERPL-SCNC: 138 MMOL/L (ref 136–145)
WBC # BLD AUTO: 5.4 K/MM3 (ref 4–10)

## 2021-03-09 PROCEDURE — U0003 INFECTIOUS AGENT DETECTION BY NUCLEIC ACID (DNA OR RNA); SEVERE ACUTE RESPIRATORY SYNDROME CORONAVIRUS 2 (SARS-COV-2) (CORONAVIRUS DISEASE [COVID-19]), AMPLIFIED PROBE TECHNIQUE, MAKING USE OF HIGH THROUGHPUT TECHNOLOGIES AS DESCRIBED BY CMS-2020-01-R: HCPCS

## 2021-03-09 PROCEDURE — C9803 HOPD COVID-19 SPEC COLLECT: HCPCS

## 2021-03-09 PROCEDURE — HZ2ZZZZ DETOXIFICATION SERVICES FOR SUBSTANCE ABUSE TREATMENT: ICD-10-PCS | Performed by: ALLERGY & IMMUNOLOGY

## 2021-03-09 RX ADMIN — Medication SCH MG: at 23:19

## 2021-03-09 RX ADMIN — HYDROXYZINE PAMOATE SCH MG: 25 CAPSULE ORAL at 13:38

## 2021-03-09 RX ADMIN — HYDROXYZINE PAMOATE SCH MG: 25 CAPSULE ORAL at 17:54

## 2021-03-09 RX ADMIN — NICOTINE SCH MG: 21 PATCH TRANSDERMAL at 13:39

## 2021-03-09 RX ADMIN — Medication SCH TAB: at 13:39

## 2021-03-09 RX ADMIN — IBUPROFEN PRN MG: 400 TABLET, FILM COATED ORAL at 23:20

## 2021-03-09 RX ADMIN — HYDROXYZINE PAMOATE SCH MG: 25 CAPSULE ORAL at 23:19

## 2021-03-09 SDOH — ECONOMIC STABILITY - HOUSING INSECURITY: HOMELESSNESS: Z59.0

## 2021-03-09 SDOH — ECONOMIC STABILITY - INCOME SECURITY: UNEMPLOYMENT, UNSPECIFIED: Z56.0

## 2021-03-10 RX ADMIN — Medication SCH TAB: at 10:36

## 2021-03-10 RX ADMIN — METHOCARBAMOL PRN MG: 500 TABLET ORAL at 18:04

## 2021-03-10 RX ADMIN — HYDROXYZINE PAMOATE SCH: 25 CAPSULE ORAL at 23:09

## 2021-03-10 RX ADMIN — HYDROXYZINE PAMOATE SCH: 25 CAPSULE ORAL at 18:03

## 2021-03-10 RX ADMIN — IBUPROFEN PRN MG: 400 TABLET, FILM COATED ORAL at 18:04

## 2021-03-10 RX ADMIN — HYDROXYZINE PAMOATE SCH: 25 CAPSULE ORAL at 13:57

## 2021-03-10 RX ADMIN — HYDROXYZINE PAMOATE SCH: 25 CAPSULE ORAL at 10:37

## 2021-03-10 RX ADMIN — HYDROXYZINE PAMOATE SCH: 25 CAPSULE ORAL at 06:42

## 2021-03-10 RX ADMIN — Medication SCH: at 23:09

## 2021-03-10 RX ADMIN — NICOTINE SCH: 21 PATCH TRANSDERMAL at 10:39

## 2021-03-11 RX ADMIN — IBUPROFEN PRN MG: 400 TABLET, FILM COATED ORAL at 05:53

## 2021-03-11 RX ADMIN — NICOTINE SCH: 21 PATCH TRANSDERMAL at 10:27

## 2021-03-11 RX ADMIN — METHOCARBAMOL PRN MG: 500 TABLET ORAL at 10:29

## 2021-03-11 RX ADMIN — HYDROXYZINE PAMOATE SCH: 25 CAPSULE ORAL at 10:27

## 2021-03-11 RX ADMIN — Medication SCH: at 10:28

## 2021-03-11 RX ADMIN — METHOCARBAMOL PRN MG: 500 TABLET ORAL at 17:50

## 2021-03-11 RX ADMIN — HYDROXYZINE PAMOATE SCH: 25 CAPSULE ORAL at 13:13

## 2021-03-11 RX ADMIN — HYDROXYZINE PAMOATE SCH: 25 CAPSULE ORAL at 05:52

## 2021-03-11 RX ADMIN — Medication SCH: at 23:17

## 2021-03-12 VITALS — SYSTOLIC BLOOD PRESSURE: 123 MMHG | HEART RATE: 73 BPM | TEMPERATURE: 98.2 F | DIASTOLIC BLOOD PRESSURE: 68 MMHG

## 2021-03-12 RX ADMIN — Medication SCH: at 10:16

## 2021-03-12 RX ADMIN — NICOTINE SCH: 21 PATCH TRANSDERMAL at 10:16

## 2021-03-12 RX ADMIN — METHOCARBAMOL PRN MG: 500 TABLET ORAL at 10:13

## 2021-06-08 ENCOUNTER — HOSPITAL ENCOUNTER (INPATIENT)
Dept: HOSPITAL 74 - YASAS | Age: 56
LOS: 1 days | Discharge: LEFT BEFORE DISCHARGE | DRG: 770 | End: 2021-06-09
Attending: ALLERGY & IMMUNOLOGY | Admitting: ALLERGY & IMMUNOLOGY
Payer: COMMERCIAL

## 2021-06-08 VITALS — BODY MASS INDEX: 20.1 KG/M2

## 2021-06-08 DIAGNOSIS — F19.24: ICD-10-CM

## 2021-06-08 DIAGNOSIS — Z59.0: ICD-10-CM

## 2021-06-08 DIAGNOSIS — F19.282: ICD-10-CM

## 2021-06-08 DIAGNOSIS — F32.9: ICD-10-CM

## 2021-06-08 DIAGNOSIS — F11.23: Primary | ICD-10-CM

## 2021-06-08 DIAGNOSIS — G40.89: ICD-10-CM

## 2021-06-08 DIAGNOSIS — I10: ICD-10-CM

## 2021-06-08 DIAGNOSIS — I25.2: ICD-10-CM

## 2021-06-08 DIAGNOSIS — Z56.0: ICD-10-CM

## 2021-06-08 DIAGNOSIS — Z91.013: ICD-10-CM

## 2021-06-08 DIAGNOSIS — F17.210: ICD-10-CM

## 2021-06-08 DIAGNOSIS — E78.5: ICD-10-CM

## 2021-06-08 PROCEDURE — U0003 INFECTIOUS AGENT DETECTION BY NUCLEIC ACID (DNA OR RNA); SEVERE ACUTE RESPIRATORY SYNDROME CORONAVIRUS 2 (SARS-COV-2) (CORONAVIRUS DISEASE [COVID-19]), AMPLIFIED PROBE TECHNIQUE, MAKING USE OF HIGH THROUGHPUT TECHNOLOGIES AS DESCRIBED BY CMS-2020-01-R: HCPCS

## 2021-06-08 PROCEDURE — U0005 INFEC AGEN DETEC AMPLI PROBE: HCPCS

## 2021-06-08 PROCEDURE — HZ2ZZZZ DETOXIFICATION SERVICES FOR SUBSTANCE ABUSE TREATMENT: ICD-10-PCS | Performed by: ALLERGY & IMMUNOLOGY

## 2021-06-08 PROCEDURE — C9803 HOPD COVID-19 SPEC COLLECT: HCPCS

## 2021-06-08 RX ADMIN — HYDROXYZINE PAMOATE SCH MG: 25 CAPSULE ORAL at 15:16

## 2021-06-08 RX ADMIN — NICOTINE SCH: 14 PATCH, EXTENDED RELEASE TRANSDERMAL at 15:19

## 2021-06-08 RX ADMIN — HYDROXYZINE PAMOATE SCH: 25 CAPSULE ORAL at 18:09

## 2021-06-08 RX ADMIN — HYDROXYZINE PAMOATE SCH: 25 CAPSULE ORAL at 22:50

## 2021-06-08 RX ADMIN — Medication SCH TAB: at 15:19

## 2021-06-08 SDOH — ECONOMIC STABILITY - HOUSING INSECURITY: HOMELESSNESS: Z59.0

## 2021-06-08 SDOH — ECONOMIC STABILITY - INCOME SECURITY: UNEMPLOYMENT, UNSPECIFIED: Z56.0

## 2021-06-09 VITALS — TEMPERATURE: 98.1 F

## 2021-06-09 VITALS — DIASTOLIC BLOOD PRESSURE: 77 MMHG | HEART RATE: 51 BPM | SYSTOLIC BLOOD PRESSURE: 165 MMHG

## 2021-06-09 LAB
ALBUMIN SERPL-MCNC: 3.4 G/DL (ref 3.4–5)
ALP SERPL-CCNC: 91 U/L (ref 45–117)
ALT SERPL-CCNC: 12 U/L (ref 13–61)
ANION GAP SERPL CALC-SCNC: 7 MMOL/L (ref 8–16)
AST SERPL-CCNC: 5 U/L (ref 15–37)
BILIRUB SERPL-MCNC: 0.3 MG/DL (ref 0.2–1)
BUN SERPL-MCNC: 7.1 MG/DL (ref 7–18)
CALCIUM SERPL-MCNC: 9.1 MG/DL (ref 8.5–10.1)
CHLORIDE SERPL-SCNC: 107 MMOL/L (ref 98–107)
CO2 SERPL-SCNC: 28 MMOL/L (ref 21–32)
CREAT SERPL-MCNC: 0.6 MG/DL (ref 0.55–1.3)
DEPRECATED RDW RBC AUTO: 13 % (ref 11.9–15.9)
GLUCOSE SERPL-MCNC: 93 MG/DL (ref 74–106)
HCT VFR BLD CALC: 36.2 % (ref 35.4–49)
HGB BLD-MCNC: 12.2 GM/DL (ref 11.7–16.9)
HIV 1+2 AB+HIV1 P24 AG SERPL QL IA: NEGATIVE
MCH RBC QN AUTO: 30.5 PG (ref 25.7–33.7)
MCHC RBC AUTO-ENTMCNC: 33.7 G/DL (ref 32–35.9)
MCV RBC: 90.4 FL (ref 80–96)
PLATELET # BLD AUTO: 290 K/MM3 (ref 134–434)
PMV BLD: 9.9 FL (ref 7.5–11.1)
PROT SERPL-MCNC: 6.9 G/DL (ref 6.4–8.2)
RBC # BLD AUTO: 4 M/MM3 (ref 4–5.6)
SODIUM SERPL-SCNC: 141 MMOL/L (ref 136–145)
WBC # BLD AUTO: 6.2 K/MM3 (ref 4–10)

## 2021-06-09 RX ADMIN — NICOTINE SCH: 14 PATCH, EXTENDED RELEASE TRANSDERMAL at 10:05

## 2021-06-09 RX ADMIN — HYDROXYZINE PAMOATE SCH: 25 CAPSULE ORAL at 10:05

## 2021-06-09 RX ADMIN — HYDROXYZINE PAMOATE SCH: 25 CAPSULE ORAL at 05:55

## 2021-06-09 RX ADMIN — Medication SCH TAB: at 10:05

## 2021-07-08 NOTE — PN
[facility] COWS





- Scale


Resting Pulse: 0= SC 80 or Below


Sweatin= Chills/Flushing


Restless Observation: 1= Difficult to Sit Still


Pupil Size: 0= Normal to Room Light


Bone or Joint Aches: 1= Mild Discomfort


Runny Nose/ Eye Tearin= Nasal Congestion


GI Upset > 30mins: 1= Stomach Cramp


Tremor Observation of Outstretched Hands: 1= Tremor Felt, Not Seen


Yawning Observation: 2= >3x During Session


Anxiety or Irritability: 2=Irritable/Anxious


Goose Flesh Skin: 0=Smooth Skin


COWS Score: 10





[facility] Progress Note (SOAP)


Subjective: 





c/o of interrupted, chills,sweats 


Objective: 





20 10:13


 Vital Signs











Temperature  96.8 F L  20 06:57


 


Pulse Rate  75   20 09:43


 


Respiratory Rate  18   20 09:43


 


Blood Pressure  139/75   20 09:43


 


O2 Sat by Pulse Oximetry (%)      











Assessment: 





20 12:06


Aox3 no acute distress 


EENT WNL 


No abd tenderness


FUll ROM no gait disturbance ambulating in the unit 





withdrawal sx 


Plan: 





increase fluids 


continue detox 


continue to monitor 


labs pending Spoke to patient, relayed information from Dr Patterson Arrow note 7/7/21. Encouraged to have keppra level drawn. Transferred to [facility] to make NOV with Dr Tomas Sykes.